# Patient Record
Sex: FEMALE | Race: BLACK OR AFRICAN AMERICAN | NOT HISPANIC OR LATINO
[De-identification: names, ages, dates, MRNs, and addresses within clinical notes are randomized per-mention and may not be internally consistent; named-entity substitution may affect disease eponyms.]

---

## 2017-03-23 PROBLEM — Z00.00 ENCOUNTER FOR PREVENTIVE HEALTH EXAMINATION: Noted: 2017-03-23

## 2017-04-14 ENCOUNTER — APPOINTMENT (OUTPATIENT)
Dept: OTOLARYNGOLOGY | Facility: CLINIC | Age: 52
End: 2017-04-14

## 2017-04-14 VITALS
DIASTOLIC BLOOD PRESSURE: 62 MMHG | WEIGHT: 250 LBS | HEIGHT: 64 IN | BODY MASS INDEX: 42.68 KG/M2 | HEART RATE: 76 BPM | SYSTOLIC BLOOD PRESSURE: 116 MMHG

## 2017-04-14 DIAGNOSIS — Z87.891 PERSONAL HISTORY OF NICOTINE DEPENDENCE: ICD-10-CM

## 2017-04-14 DIAGNOSIS — Z87.19 PERSONAL HISTORY OF OTHER DISEASES OF THE DIGESTIVE SYSTEM: ICD-10-CM

## 2017-04-14 DIAGNOSIS — Z82.49 FAMILY HISTORY OF ISCHEMIC HEART DISEASE AND OTHER DISEASES OF THE CIRCULATORY SYSTEM: ICD-10-CM

## 2017-04-14 DIAGNOSIS — I25.2 OLD MYOCARDIAL INFARCTION: ICD-10-CM

## 2017-04-14 DIAGNOSIS — I50.9 HEART FAILURE, UNSPECIFIED: ICD-10-CM

## 2017-04-14 DIAGNOSIS — K42.9 UMBILICAL HERNIA W/OUT OBSTRUCTION OR GANGRENE: ICD-10-CM

## 2017-04-14 DIAGNOSIS — E11.9 TYPE 2 DIABETES MELLITUS W/OUT COMPLICATIONS: ICD-10-CM

## 2017-04-14 DIAGNOSIS — Z83.3 FAMILY HISTORY OF DIABETES MELLITUS: ICD-10-CM

## 2017-04-14 DIAGNOSIS — Z78.9 OTHER SPECIFIED HEALTH STATUS: ICD-10-CM

## 2017-04-14 DIAGNOSIS — Z80.1 FAMILY HISTORY OF MALIGNANT NEOPLASM OF TRACHEA, BRONCHUS AND LUNG: ICD-10-CM

## 2017-04-14 DIAGNOSIS — Z86.79 PERSONAL HISTORY OF OTHER DISEASES OF THE CIRCULATORY SYSTEM: ICD-10-CM

## 2017-04-14 DIAGNOSIS — E78.00 PURE HYPERCHOLESTEROLEMIA, UNSPECIFIED: ICD-10-CM

## 2017-04-14 DIAGNOSIS — Z87.09 PERSONAL HISTORY OF OTHER DISEASES OF THE RESPIRATORY SYSTEM: ICD-10-CM

## 2017-04-24 ENCOUNTER — RESULT REVIEW (OUTPATIENT)
Age: 52
End: 2017-04-24

## 2017-05-22 ENCOUNTER — APPOINTMENT (OUTPATIENT)
Dept: GASTROENTEROLOGY | Facility: CLINIC | Age: 52
End: 2017-05-22

## 2017-05-22 VITALS
HEIGHT: 64 IN | RESPIRATION RATE: 16 BRPM | HEART RATE: 85 BPM | OXYGEN SATURATION: 98 % | DIASTOLIC BLOOD PRESSURE: 85 MMHG | BODY MASS INDEX: 42.68 KG/M2 | SYSTOLIC BLOOD PRESSURE: 161 MMHG | WEIGHT: 250 LBS

## 2017-06-29 ENCOUNTER — FORM ENCOUNTER (OUTPATIENT)
Age: 52
End: 2017-06-29

## 2017-06-30 ENCOUNTER — OUTPATIENT (OUTPATIENT)
Dept: OUTPATIENT SERVICES | Facility: HOSPITAL | Age: 52
LOS: 1 days | End: 2017-06-30
Payer: MEDICAID

## 2017-06-30 VITALS
HEIGHT: 64 IN | TEMPERATURE: 99 F | WEIGHT: 263.01 LBS | OXYGEN SATURATION: 98 % | DIASTOLIC BLOOD PRESSURE: 90 MMHG | RESPIRATION RATE: 16 BRPM | HEART RATE: 88 BPM | SYSTOLIC BLOOD PRESSURE: 140 MMHG

## 2017-06-30 DIAGNOSIS — E11.9 TYPE 2 DIABETES MELLITUS WITHOUT COMPLICATIONS: ICD-10-CM

## 2017-06-30 DIAGNOSIS — I25.2 OLD MYOCARDIAL INFARCTION: ICD-10-CM

## 2017-06-30 DIAGNOSIS — E07.9 DISORDER OF THYROID, UNSPECIFIED: ICD-10-CM

## 2017-06-30 DIAGNOSIS — Z98.891 HISTORY OF UTERINE SCAR FROM PREVIOUS SURGERY: Chronic | ICD-10-CM

## 2017-06-30 DIAGNOSIS — I10 ESSENTIAL (PRIMARY) HYPERTENSION: ICD-10-CM

## 2017-06-30 DIAGNOSIS — J45.909 UNSPECIFIED ASTHMA, UNCOMPLICATED: ICD-10-CM

## 2017-06-30 DIAGNOSIS — Z90.710 ACQUIRED ABSENCE OF BOTH CERVIX AND UTERUS: Chronic | ICD-10-CM

## 2017-06-30 LAB
BUN SERPL-MCNC: 16 MG/DL — SIGNIFICANT CHANGE UP (ref 7–23)
CALCIUM SERPL-MCNC: 9.8 MG/DL — SIGNIFICANT CHANGE UP (ref 8.4–10.5)
CHLORIDE SERPL-SCNC: 98 MMOL/L — SIGNIFICANT CHANGE UP (ref 98–107)
CO2 SERPL-SCNC: 24 MMOL/L — SIGNIFICANT CHANGE UP (ref 22–31)
CREAT SERPL-MCNC: 1.06 MG/DL — SIGNIFICANT CHANGE UP (ref 0.5–1.3)
GLUCOSE SERPL-MCNC: 213 MG/DL — HIGH (ref 70–99)
HBA1C BLD-MCNC: 7.1 % — HIGH (ref 4–5.6)
HCT VFR BLD CALC: 34.8 % — SIGNIFICANT CHANGE UP (ref 34.5–45)
HGB BLD-MCNC: 11.7 G/DL — SIGNIFICANT CHANGE UP (ref 11.5–15.5)
MCHC RBC-ENTMCNC: 28.8 PG — SIGNIFICANT CHANGE UP (ref 27–34)
MCHC RBC-ENTMCNC: 33.6 % — SIGNIFICANT CHANGE UP (ref 32–36)
MCV RBC AUTO: 85.7 FL — SIGNIFICANT CHANGE UP (ref 80–100)
NRBC # FLD: 0 — SIGNIFICANT CHANGE UP
PLATELET # BLD AUTO: 214 K/UL — SIGNIFICANT CHANGE UP (ref 150–400)
PMV BLD: 12 FL — SIGNIFICANT CHANGE UP (ref 7–13)
POTASSIUM SERPL-MCNC: 3.3 MMOL/L — LOW (ref 3.5–5.3)
POTASSIUM SERPL-SCNC: 3.3 MMOL/L — LOW (ref 3.5–5.3)
RBC # BLD: 4.06 M/UL — SIGNIFICANT CHANGE UP (ref 3.8–5.2)
RBC # FLD: 14.3 % — SIGNIFICANT CHANGE UP (ref 10.3–14.5)
SODIUM SERPL-SCNC: 139 MMOL/L — SIGNIFICANT CHANGE UP (ref 135–145)
WBC # BLD: 10.62 K/UL — HIGH (ref 3.8–10.5)
WBC # FLD AUTO: 10.62 K/UL — HIGH (ref 3.8–10.5)

## 2017-06-30 PROCEDURE — 93010 ELECTROCARDIOGRAM REPORT: CPT

## 2017-06-30 PROCEDURE — 71020: CPT | Mod: 26

## 2017-06-30 RX ORDER — SODIUM CHLORIDE 9 MG/ML
1000 INJECTION, SOLUTION INTRAVENOUS
Qty: 0 | Refills: 0 | Status: DISCONTINUED | OUTPATIENT
Start: 2017-07-17 | End: 2017-07-18

## 2017-06-30 NOTE — H&P PST ADULT - PMH
Asthma    GERD (gastroesophageal reflux disease)    HLD (hyperlipidemia)    Hypertension    MI, old  at age 41  Obesity    Smoking history Asthma    CHF (congestive heart failure)    GERD (gastroesophageal reflux disease)    HLD (hyperlipidemia)    Hypertension    MI, old  at age 41  Obesity    Smoking history Asthma    CHF (congestive heart failure)    Diabetes mellitus    GERD (gastroesophageal reflux disease)    HLD (hyperlipidemia)    Hypertension    MI, old  at age 41  Obesity    Smoking history

## 2017-06-30 NOTE — H&P PST ADULT - HISTORY OF PRESENT ILLNESS
52 yr old female PMH HTN, DM type 2, MI 11 yrs ago, presents to PST with pre op dx: Disorder of thyroid, unspecified for pre op evaluation and scheduled for 52 yr old female PMH HTN, DM type 2, MI 11 yrs ago, presents to PST with pre op dx: Disorder of thyroid, unspecified for pre op evaluation and scheduled for Left thyroidectomy on 7/17/2017 52 yr old female PMH HTN, DM type 2, MI -11 yrs ago, presents to PST with pre op dx: Disorder of thyroid, unspecified for pre op evaluation and scheduled for Left thyroidectomy on 7/17/2017

## 2017-06-30 NOTE — H&P PST ADULT - PROBLEM SELECTOR PLAN 1
52 yr old female scheduled for Left thyroidectomy on 7/17/2017  Procedure and site confirmed with patient and surgical coordinator as left thyroidectomy  Pre op instruction given and patient verbalized understanding  4 Positive on STOP BANG questioner, Jerome precaution recommended, OR booking notified

## 2017-06-30 NOTE — H&P PST ADULT - PROBLEM SELECTOR PLAN 5
Patient instructed not to take Metformin the night before and in am of surgery  Will check FSBS in am of surgery

## 2017-06-30 NOTE — H&P PST ADULT - PROBLEM SELECTOR PLAN 3
Denies chest pain or discomfort, Last dose of  mg 7 days prior to surgery as per Surgeon  Will obtain stress, echo result and cardiology clearance

## 2017-07-06 ENCOUNTER — APPOINTMENT (OUTPATIENT)
Dept: GASTROENTEROLOGY | Facility: GI CENTER | Age: 52
End: 2017-07-06

## 2017-07-06 ENCOUNTER — OUTPATIENT (OUTPATIENT)
Dept: OUTPATIENT SERVICES | Facility: HOSPITAL | Age: 52
LOS: 1 days | End: 2017-07-06
Payer: COMMERCIAL

## 2017-07-06 ENCOUNTER — RESULT REVIEW (OUTPATIENT)
Age: 52
End: 2017-07-06

## 2017-07-06 DIAGNOSIS — R12 HEARTBURN: ICD-10-CM

## 2017-07-06 DIAGNOSIS — Z98.891 HISTORY OF UTERINE SCAR FROM PREVIOUS SURGERY: Chronic | ICD-10-CM

## 2017-07-06 DIAGNOSIS — Z90.710 ACQUIRED ABSENCE OF BOTH CERVIX AND UTERUS: Chronic | ICD-10-CM

## 2017-07-06 PROCEDURE — 88305 TISSUE EXAM BY PATHOLOGIST: CPT | Mod: 26

## 2017-07-06 PROCEDURE — 88305 TISSUE EXAM BY PATHOLOGIST: CPT

## 2017-07-06 PROCEDURE — 88342 IMHCHEM/IMCYTCHM 1ST ANTB: CPT

## 2017-07-06 PROCEDURE — 88341 IMHCHEM/IMCYTCHM EA ADD ANTB: CPT | Mod: 26

## 2017-07-06 PROCEDURE — 88342 IMHCHEM/IMCYTCHM 1ST ANTB: CPT | Mod: 26

## 2017-07-06 PROCEDURE — 43239 EGD BIOPSY SINGLE/MULTIPLE: CPT

## 2017-07-07 LAB — SURGICAL PATHOLOGY FINAL REPORT - CH: SIGNIFICANT CHANGE UP

## 2017-07-14 NOTE — ASU PATIENT PROFILE, ADULT - PMH
Asthma    CHF (congestive heart failure)    Diabetes mellitus    GERD (gastroesophageal reflux disease)    HLD (hyperlipidemia)    Hypertension    MI, old  at age 41  Obesity    Smoking history Asthma    CHF (congestive heart failure)    Diabetes mellitus    GERD (gastroesophageal reflux disease)    HLD (hyperlipidemia)    Hypertension    MI, old  at age 41  Obesity    Smoking history    Tendon tear  on each side of right knee

## 2017-07-17 ENCOUNTER — RESULT REVIEW (OUTPATIENT)
Age: 52
End: 2017-07-17

## 2017-07-17 ENCOUNTER — APPOINTMENT (OUTPATIENT)
Dept: OTOLARYNGOLOGY | Facility: HOSPITAL | Age: 52
End: 2017-07-17

## 2017-07-17 ENCOUNTER — INPATIENT (INPATIENT)
Facility: HOSPITAL | Age: 52
LOS: 0 days | Discharge: HOME | End: 2017-07-18
Attending: OTOLARYNGOLOGY | Admitting: OTOLARYNGOLOGY
Payer: MEDICAID

## 2017-07-17 ENCOUNTER — TRANSCRIPTION ENCOUNTER (OUTPATIENT)
Age: 52
End: 2017-07-17

## 2017-07-17 VITALS
WEIGHT: 263.01 LBS | OXYGEN SATURATION: 100 % | RESPIRATION RATE: 16 BRPM | SYSTOLIC BLOOD PRESSURE: 145 MMHG | HEART RATE: 67 BPM | HEIGHT: 64 IN | TEMPERATURE: 99 F | DIASTOLIC BLOOD PRESSURE: 84 MMHG

## 2017-07-17 DIAGNOSIS — E07.9 DISORDER OF THYROID, UNSPECIFIED: ICD-10-CM

## 2017-07-17 DIAGNOSIS — Z98.891 HISTORY OF UTERINE SCAR FROM PREVIOUS SURGERY: Chronic | ICD-10-CM

## 2017-07-17 DIAGNOSIS — Z90.710 ACQUIRED ABSENCE OF BOTH CERVIX AND UTERUS: Chronic | ICD-10-CM

## 2017-07-17 LAB
GLUCOSE BLDV-MCNC: 146 — HIGH (ref 70–99)
HCG UR QL: NEGATIVE — SIGNIFICANT CHANGE UP
POTASSIUM BLDV-SCNC: 4.4 MMOL/L — SIGNIFICANT CHANGE UP (ref 3.4–4.5)

## 2017-07-17 PROCEDURE — 71010: CPT | Mod: 26

## 2017-07-17 PROCEDURE — 88307 TISSUE EXAM BY PATHOLOGIST: CPT | Mod: 26

## 2017-07-17 PROCEDURE — 60220 PARTIAL REMOVAL OF THYROID: CPT | Mod: LT

## 2017-07-17 PROCEDURE — 60220 PARTIAL REMOVAL OF THYROID: CPT | Mod: AS,LT

## 2017-07-17 RX ORDER — INSULIN LISPRO 100/ML
VIAL (ML) SUBCUTANEOUS
Qty: 0 | Refills: 0 | Status: DISCONTINUED | OUTPATIENT
Start: 2017-07-17 | End: 2017-07-17

## 2017-07-17 RX ORDER — GLUCAGON INJECTION, SOLUTION 0.5 MG/.1ML
1 INJECTION, SOLUTION SUBCUTANEOUS ONCE
Qty: 0 | Refills: 0 | Status: DISCONTINUED | OUTPATIENT
Start: 2017-07-17 | End: 2017-07-18

## 2017-07-17 RX ORDER — METOPROLOL TARTRATE 50 MG
50 TABLET ORAL DAILY
Qty: 0 | Refills: 0 | Status: DISCONTINUED | OUTPATIENT
Start: 2017-07-17 | End: 2017-07-18

## 2017-07-17 RX ORDER — LISINOPRIL 2.5 MG/1
40 TABLET ORAL DAILY
Qty: 0 | Refills: 0 | Status: DISCONTINUED | OUTPATIENT
Start: 2017-07-17 | End: 2017-07-18

## 2017-07-17 RX ORDER — OXYCODONE AND ACETAMINOPHEN 5; 325 MG/1; MG/1
2 TABLET ORAL EVERY 4 HOURS
Qty: 0 | Refills: 0 | Status: DISCONTINUED | OUTPATIENT
Start: 2017-07-17 | End: 2017-07-17

## 2017-07-17 RX ORDER — DEXTROSE 50 % IN WATER 50 %
12.5 SYRINGE (ML) INTRAVENOUS ONCE
Qty: 0 | Refills: 0 | Status: DISCONTINUED | OUTPATIENT
Start: 2017-07-17 | End: 2017-07-18

## 2017-07-17 RX ORDER — SODIUM CHLORIDE 9 MG/ML
3 INJECTION INTRAMUSCULAR; INTRAVENOUS; SUBCUTANEOUS EVERY 8 HOURS
Qty: 0 | Refills: 0 | Status: DISCONTINUED | OUTPATIENT
Start: 2017-07-17 | End: 2017-07-18

## 2017-07-17 RX ORDER — NIFEDIPINE 30 MG
60 TABLET, EXTENDED RELEASE 24 HR ORAL DAILY
Qty: 0 | Refills: 0 | Status: DISCONTINUED | OUTPATIENT
Start: 2017-07-17 | End: 2017-07-18

## 2017-07-17 RX ORDER — FENTANYL CITRATE 50 UG/ML
25 INJECTION INTRAVENOUS
Qty: 0 | Refills: 0 | Status: DISCONTINUED | OUTPATIENT
Start: 2017-07-17 | End: 2017-07-17

## 2017-07-17 RX ORDER — OXYCODONE AND ACETAMINOPHEN 5; 325 MG/1; MG/1
1 TABLET ORAL EVERY 4 HOURS
Qty: 0 | Refills: 0 | Status: DISCONTINUED | OUTPATIENT
Start: 2017-07-17 | End: 2017-07-17

## 2017-07-17 RX ORDER — PROPOFOL 10 MG/ML
100 INJECTION, EMULSION INTRAVENOUS
Qty: 500 | Refills: 0 | Status: DISCONTINUED | OUTPATIENT
Start: 2017-07-17 | End: 2017-07-17

## 2017-07-17 RX ORDER — OXYCODONE AND ACETAMINOPHEN 5; 325 MG/1; MG/1
1 TABLET ORAL EVERY 6 HOURS
Qty: 0 | Refills: 0 | Status: DISCONTINUED | OUTPATIENT
Start: 2017-07-17 | End: 2017-07-18

## 2017-07-17 RX ORDER — DEXTROSE 50 % IN WATER 50 %
25 SYRINGE (ML) INTRAVENOUS ONCE
Qty: 0 | Refills: 0 | Status: DISCONTINUED | OUTPATIENT
Start: 2017-07-17 | End: 2017-07-18

## 2017-07-17 RX ORDER — PANTOPRAZOLE SODIUM 20 MG/1
40 TABLET, DELAYED RELEASE ORAL
Qty: 0 | Refills: 0 | Status: DISCONTINUED | OUTPATIENT
Start: 2017-07-17 | End: 2017-07-18

## 2017-07-17 RX ORDER — OXYCODONE AND ACETAMINOPHEN 5; 325 MG/1; MG/1
2 TABLET ORAL EVERY 6 HOURS
Qty: 0 | Refills: 0 | Status: DISCONTINUED | OUTPATIENT
Start: 2017-07-17 | End: 2017-07-18

## 2017-07-17 RX ORDER — DEXTROSE 50 % IN WATER 50 %
1 SYRINGE (ML) INTRAVENOUS ONCE
Qty: 0 | Refills: 0 | Status: DISCONTINUED | OUTPATIENT
Start: 2017-07-17 | End: 2017-07-18

## 2017-07-17 RX ORDER — HYDROCHLOROTHIAZIDE 25 MG
25 TABLET ORAL DAILY
Qty: 0 | Refills: 0 | Status: DISCONTINUED | OUTPATIENT
Start: 2017-07-17 | End: 2017-07-18

## 2017-07-17 RX ORDER — ALBUTEROL 90 UG/1
2 AEROSOL, METERED ORAL EVERY 6 HOURS
Qty: 0 | Refills: 0 | Status: DISCONTINUED | OUTPATIENT
Start: 2017-07-17 | End: 2017-07-18

## 2017-07-17 RX ORDER — INSULIN LISPRO 100/ML
VIAL (ML) SUBCUTANEOUS
Qty: 0 | Refills: 0 | Status: DISCONTINUED | OUTPATIENT
Start: 2017-07-17 | End: 2017-07-18

## 2017-07-17 RX ORDER — ONDANSETRON 8 MG/1
4 TABLET, FILM COATED ORAL ONCE
Qty: 0 | Refills: 0 | Status: DISCONTINUED | OUTPATIENT
Start: 2017-07-17 | End: 2017-07-17

## 2017-07-17 RX ORDER — SODIUM CHLORIDE 9 MG/ML
1000 INJECTION, SOLUTION INTRAVENOUS
Qty: 0 | Refills: 0 | Status: DISCONTINUED | OUTPATIENT
Start: 2017-07-17 | End: 2017-07-18

## 2017-07-17 RX ORDER — ATORVASTATIN CALCIUM 80 MG/1
10 TABLET, FILM COATED ORAL AT BEDTIME
Qty: 0 | Refills: 0 | Status: DISCONTINUED | OUTPATIENT
Start: 2017-07-17 | End: 2017-07-18

## 2017-07-17 RX ADMIN — OXYCODONE AND ACETAMINOPHEN 1 TABLET(S): 5; 325 TABLET ORAL at 20:05

## 2017-07-17 RX ADMIN — OXYCODONE AND ACETAMINOPHEN 1 TABLET(S): 5; 325 TABLET ORAL at 19:35

## 2017-07-17 RX ADMIN — Medication 50 MILLIGRAM(S): at 19:35

## 2017-07-17 RX ADMIN — SODIUM CHLORIDE 3 MILLILITER(S): 9 INJECTION INTRAMUSCULAR; INTRAVENOUS; SUBCUTANEOUS at 21:46

## 2017-07-17 RX ADMIN — PROPOFOL 71.58 MICROGRAM(S)/KG/MIN: 10 INJECTION, EMULSION INTRAVENOUS at 11:00

## 2017-07-17 RX ADMIN — OXYCODONE AND ACETAMINOPHEN 2 TABLET(S): 5; 325 TABLET ORAL at 22:38

## 2017-07-17 RX ADMIN — SODIUM CHLORIDE 3 MILLILITER(S): 9 INJECTION INTRAMUSCULAR; INTRAVENOUS; SUBCUTANEOUS at 12:45

## 2017-07-17 RX ADMIN — OXYCODONE AND ACETAMINOPHEN 2 TABLET(S): 5; 325 TABLET ORAL at 23:08

## 2017-07-17 RX ADMIN — FENTANYL CITRATE 25 MICROGRAM(S): 50 INJECTION INTRAVENOUS at 17:15

## 2017-07-17 RX ADMIN — SODIUM CHLORIDE 30 MILLILITER(S): 9 INJECTION, SOLUTION INTRAVENOUS at 11:00

## 2017-07-17 RX ADMIN — Medication 2: at 19:56

## 2017-07-17 NOTE — PROVIDER CONTACT NOTE (OTHER) - REASON
Patient hypertensive. Patient does not have sliding scale for insulin. Patient hypertensive. Patient does not have sliding scale for insulin before bedtime

## 2017-07-17 NOTE — ASU DISCHARGE PLAN (ADULT/PEDIATRIC). - MEDICATION SUMMARY - MEDICATIONS TO TAKE
I will START or STAY ON the medications listed below when I get home from the hospital:    oxycodone-acetaminophen 5mg-325mg oral tablet  -- 1 tab(s) by mouth every 6 hours MDD:8 tabs  -- Caution federal law prohibits the transfer of this drug to any person other  than the person for whom it was prescribed.  May cause drowsiness.  Alcohol may intensify this effect.  Use care when operating dangerous machinery.  This prescription cannot be refilled.  This product contains acetaminophen.  Do not use  with any other product containing acetaminophen to prevent possible liver damage.  Using more of this medication than prescribed may cause serious breathing problems.    -- Indication: For Moderate pain    aspirin 325 mg oral tablet  -- 1 tab by mouth once a day (LAST DOSE 7/9)  -- Indication: For analgesia    lisinopril 40 mg oral tablet  -- 1 tab(s) by mouth once a day am  -- Indication: For hypertension    metFORMIN 1000 mg oral tablet  -- 1 tab(s) by mouth 2 times a day  -- Indication: For Diabetes    atorvastatin 10 mg oral tablet  -- 1 tab(s) by mouth once a day (at bedtime)  -- Indication: For hypercholesterolemia    metoprolol tartrate 50 mg oral tablet  -- 1 tab(s) by mouth 2 times a day  -- Indication: For hyptertension    albuterol 90 mcg/inh inhalation aerosol  -- 2 puff(s) inhaled   a day, As Needed  -- Indication: For asthma    NIFEdipine 60 mg oral tablet, extended release  -- 1 tab(s) by mouth once a day,am  -- Indication: For hypertension    hydroCHLOROthiazide 25 mg oral tablet  -- 1 tab(s) by mouth once a day am  -- Indication: For hypertension    omeprazole 40 mg oral delayed release capsule  -- 1 cap(s) by mouth once a day am  -- Indication: For GERD    Flovent  mcg/inh inhalation aerosol  -- 2 puff(s) inhaled 2 times a day, As Needed  -- Indication: For Asthma

## 2017-07-17 NOTE — PROVIDER CONTACT NOTE (OTHER) - ASSESSMENT
/90  HR 93  Patient asymptomatic. Denies SOB, chest pain, dizziness or lightheadedness.  Patient does not have sliding scale for insulin. Only before meals. /90  HR 93  Patient asymptomatic. Denies SOB, chest pain, dizziness or lightheadedness.  Patient does not have sliding scale for insulin before bedtime. Only before meals. /90, HR 93, Patient asymptomatic. Denies SOB, chest pain, dizziness or lightheadedness.    . Patient does not have sliding scale for insulin before bedtime. Only before meals.

## 2017-07-18 ENCOUNTER — TRANSCRIPTION ENCOUNTER (OUTPATIENT)
Age: 52
End: 2017-07-18

## 2017-07-18 VITALS
OXYGEN SATURATION: 100 % | DIASTOLIC BLOOD PRESSURE: 76 MMHG | HEART RATE: 78 BPM | RESPIRATION RATE: 18 BRPM | SYSTOLIC BLOOD PRESSURE: 138 MMHG | TEMPERATURE: 98 F

## 2017-07-18 RX ORDER — OXYCODONE HYDROCHLORIDE 5 MG/1
2 TABLET ORAL
Qty: 40 | Refills: 0 | OUTPATIENT
Start: 2017-07-18 | End: 2017-07-23

## 2017-07-18 RX ADMIN — PANTOPRAZOLE SODIUM 40 MILLIGRAM(S): 20 TABLET, DELAYED RELEASE ORAL at 07:35

## 2017-07-18 RX ADMIN — OXYCODONE AND ACETAMINOPHEN 2 TABLET(S): 5; 325 TABLET ORAL at 08:28

## 2017-07-18 RX ADMIN — Medication 2: at 09:07

## 2017-07-18 RX ADMIN — Medication: at 00:03

## 2017-07-18 RX ADMIN — SODIUM CHLORIDE 3 MILLILITER(S): 9 INJECTION INTRAMUSCULAR; INTRAVENOUS; SUBCUTANEOUS at 05:04

## 2017-07-18 RX ADMIN — Medication 25 MILLIGRAM(S): at 07:34

## 2017-07-18 RX ADMIN — OXYCODONE AND ACETAMINOPHEN 2 TABLET(S): 5; 325 TABLET ORAL at 07:58

## 2017-07-18 RX ADMIN — LISINOPRIL 40 MILLIGRAM(S): 2.5 TABLET ORAL at 07:34

## 2017-07-18 RX ADMIN — Medication 60 MILLIGRAM(S): at 07:35

## 2017-07-18 RX ADMIN — Medication 50 MILLIGRAM(S): at 07:35

## 2017-07-18 NOTE — DISCHARGE NOTE ADULT - INSTRUCTIONS
Watch for signs of infection; redness, swelling, fever, chills or heat, report such symptoms to the MD. No driving while taking pain medication, it causes drowsiness & constipation. Drink 6-8 glasses of fluids daily to promote hydration. No heavy lifting, pulling or pushing heavy objects. Follow up with primary & surgical MD. Follow up with endocrinologists regarding blood sugars. Remove neck dressing tomorrow but keep small steri strips in place. Shower as directed by MD, do not scrub site, allow water to run over incision & pat dry. Do not apply any lotions, ointments or powders to incision. Alert & oriented. Out of bed ambulating. Tolerating diet without nausea or vomiting. No neck swelling or respiratory difficulty. Neck steris are dry & intact. SHANTHI drain removed, dressing is dry & intact over site. Will follow up with endocrinologists regarding blood sugars. Voiding without difficulty. Vitals stable, afebrile. Understands all discharge instruction & will follow up with the MD. Time allowed for questions.

## 2017-07-18 NOTE — DISCHARGE NOTE ADULT - CARE PLAN
Principal Discharge DX:	Thyroid mass  Goal:	hemithyroidectomy  Instructions for follow-up, activity and diet:	regular

## 2017-07-18 NOTE — DISCHARGE NOTE ADULT - CARE PROVIDER_API CALL
Willie Garcia), [facility]  [address] [place] [id number]  Phone: [phone]  Fax: [phone]

## 2017-07-18 NOTE — PROGRESS NOTE ADULT - SUBJECTIVE AND OBJECTIVE BOX
Pt seen and examined, no acute events overnight, no resp issues    avss  nad, lying in bed  nonlabored breathing on ra  nc clear  oc/op clear  neck soft, flat, ECTOR w/ minimal old blood drainage, incision c/d/i    A/P: 52F s/p L thyroidectomy  -pain control prn  -monitor resp status  -ector output  -home meds  -oob ad king/dvt ppx  -will d/w attending dispo

## 2017-07-18 NOTE — DISCHARGE NOTE ADULT - PATIENT PORTAL LINK FT
“You can access the [facility] Patient Portal, offered by [facility], by registering with the following website: [email or web address]”

## 2017-07-18 NOTE — DISCHARGE NOTE ADULT - MEDICATION SUMMARY - MEDICATIONS TO TAKE
I will START or STAY ON the medications listed below when I get home from the hospital:    acetaminophen-oxycodone 325 mg-5 mg oral tablet  -- 2 tab(s) by mouth every 6 hours, As needed, Moderate Pain (4 - 6) MDD:8  -- Indication: For pain    aspirin 325 mg oral tablet  -- 1 tab by mouth once a day (LAST DOSE 7/9)  -- Indication: For outpt med    lisinopril 40 mg oral tablet  -- 1 tab(s) by mouth once a day am  -- Indication: For HTN    metFORMIN 1000 mg oral tablet  -- 1 tab(s) by mouth 2 times a day  -- Indication: For DM    atorvastatin 10 mg oral tablet  -- 1 tab(s) by mouth once a day (at bedtime)  -- Indication: For cholesterol    metoprolol tartrate 50 mg oral tablet  -- 1 tab(s) by mouth 2 times a day  -- Indication: For HTN    albuterol 90 mcg/inh inhalation aerosol  -- 2 puff(s) inhaled   a day, As Needed  -- Indication: For asthma    NIFEdipine 60 mg oral tablet, extended release  -- 1 tab(s) by mouth once a day,am  -- Indication: For HTN    hydroCHLOROthiazide 25 mg oral tablet  -- 1 tab(s) by mouth once a day am  -- Indication: For HTN    omeprazole 40 mg oral delayed release capsule  -- 1 cap(s) by mouth once a day am  -- Indication: For GERD    Flovent  mcg/inh inhalation aerosol  -- 2 puff(s) inhaled 2 times a day, As Needed  -- Indication: For asthma

## 2017-07-18 NOTE — DISCHARGE NOTE ADULT - CONDITIONS AT DISCHARGE
Alert & oriented. Out of bed ambulating. Tolerating diet without nausea or vomiting. No neck swelling or respiratory difficulty. Neck steris are dry & intact. SHANTHI drain removed, dressing is dry & intact over site. Will follow up with endocrinologists regarding blood sugars. Voiding without difficulty. Vitals stable, afebrile. Understands all discharge instruction & will follow up with the MD. Time allowed for questions.

## 2017-07-19 PROBLEM — T14.8 OTHER INJURY OF UNSPECIFIED BODY REGION: Chronic | Status: ACTIVE | Noted: 2017-07-17

## 2017-07-21 LAB — SURGICAL PATHOLOGY STUDY: SIGNIFICANT CHANGE UP

## 2017-07-26 ENCOUNTER — APPOINTMENT (OUTPATIENT)
Dept: OTOLARYNGOLOGY | Facility: CLINIC | Age: 52
End: 2017-07-26

## 2017-08-01 ENCOUNTER — APPOINTMENT (OUTPATIENT)
Dept: OTOLARYNGOLOGY | Facility: CLINIC | Age: 52
End: 2017-08-01
Payer: MEDICAID

## 2017-08-01 VITALS
RESPIRATION RATE: 18 BRPM | BODY MASS INDEX: 42.68 KG/M2 | HEART RATE: 76 BPM | DIASTOLIC BLOOD PRESSURE: 88 MMHG | WEIGHT: 250 LBS | SYSTOLIC BLOOD PRESSURE: 125 MMHG | HEIGHT: 64 IN

## 2017-08-01 DIAGNOSIS — Z09 ENCOUNTER FOR FOLLOW-UP EXAMINATION AFTER COMPLETED TREATMENT FOR CONDITIONS OTHER THAN MALIGNANT NEOPLASM: ICD-10-CM

## 2017-08-01 DIAGNOSIS — E07.9 DISORDER OF THYROID, UNSPECIFIED: ICD-10-CM

## 2017-08-01 PROCEDURE — 99024 POSTOP FOLLOW-UP VISIT: CPT

## 2018-01-31 ENCOUNTER — OUTPATIENT (OUTPATIENT)
Dept: OUTPATIENT SERVICES | Facility: HOSPITAL | Age: 53
LOS: 1 days | Discharge: HOME | End: 2018-01-31
Payer: OTHER MISCELLANEOUS

## 2018-01-31 VITALS
RESPIRATION RATE: 18 BRPM | OXYGEN SATURATION: 97 % | SYSTOLIC BLOOD PRESSURE: 152 MMHG | DIASTOLIC BLOOD PRESSURE: 94 MMHG | WEIGHT: 251.33 LBS | TEMPERATURE: 98 F | HEIGHT: 64 IN | HEART RATE: 80 BPM

## 2018-01-31 DIAGNOSIS — Z01.818 ENCOUNTER FOR OTHER PREPROCEDURAL EXAMINATION: ICD-10-CM

## 2018-01-31 DIAGNOSIS — E78.5 HYPERLIPIDEMIA, UNSPECIFIED: ICD-10-CM

## 2018-01-31 DIAGNOSIS — M25.569 PAIN IN UNSPECIFIED KNEE: ICD-10-CM

## 2018-01-31 DIAGNOSIS — I10 ESSENTIAL (PRIMARY) HYPERTENSION: ICD-10-CM

## 2018-01-31 DIAGNOSIS — Z98.891 HISTORY OF UTERINE SCAR FROM PREVIOUS SURGERY: Chronic | ICD-10-CM

## 2018-01-31 DIAGNOSIS — Z90.710 ACQUIRED ABSENCE OF BOTH CERVIX AND UTERUS: Chronic | ICD-10-CM

## 2018-01-31 DIAGNOSIS — E89.0 POSTPROCEDURAL HYPOTHYROIDISM: Chronic | ICD-10-CM

## 2018-01-31 DIAGNOSIS — E11.9 TYPE 2 DIABETES MELLITUS WITHOUT COMPLICATIONS: ICD-10-CM

## 2018-01-31 DIAGNOSIS — S83.241D OTHER TEAR OF MEDIAL MENISCUS, CURRENT INJURY, RIGHT KNEE, SUBSEQUENT ENCOUNTER: ICD-10-CM

## 2018-01-31 DIAGNOSIS — J45.909 UNSPECIFIED ASTHMA, UNCOMPLICATED: ICD-10-CM

## 2018-01-31 LAB
ANION GAP SERPL CALC-SCNC: 10 MMOL/L — SIGNIFICANT CHANGE UP (ref 5–17)
BASOPHILS # BLD AUTO: 0.06 K/UL — SIGNIFICANT CHANGE UP (ref 0–0.2)
BASOPHILS NFR BLD AUTO: 0.9 % — SIGNIFICANT CHANGE UP (ref 0–2)
BUN SERPL-MCNC: 12 MG/DL — SIGNIFICANT CHANGE UP (ref 7–23)
CALCIUM SERPL-MCNC: 9.5 MG/DL — SIGNIFICANT CHANGE UP (ref 8.5–10.1)
CHLORIDE SERPL-SCNC: 105 MMOL/L — SIGNIFICANT CHANGE UP (ref 96–108)
CO2 SERPL-SCNC: 25 MMOL/L — SIGNIFICANT CHANGE UP (ref 22–31)
CREAT SERPL-MCNC: 1.02 MG/DL — SIGNIFICANT CHANGE UP (ref 0.5–1.3)
EOSINOPHIL # BLD AUTO: 0.23 K/UL — SIGNIFICANT CHANGE UP (ref 0–0.5)
EOSINOPHIL NFR BLD AUTO: 3.3 % — SIGNIFICANT CHANGE UP (ref 0–6)
GLUCOSE SERPL-MCNC: 134 MG/DL — HIGH (ref 70–99)
HCT VFR BLD CALC: 39 % — SIGNIFICANT CHANGE UP (ref 34.5–45)
HGB BLD-MCNC: 13.5 G/DL — SIGNIFICANT CHANGE UP (ref 11.5–15.5)
IMM GRANULOCYTES NFR BLD AUTO: 0.3 % — SIGNIFICANT CHANGE UP (ref 0–1.5)
LYMPHOCYTES # BLD AUTO: 1.67 K/UL — SIGNIFICANT CHANGE UP (ref 1–3.3)
LYMPHOCYTES # BLD AUTO: 24 % — SIGNIFICANT CHANGE UP (ref 13–44)
MCHC RBC-ENTMCNC: 28.6 PG — SIGNIFICANT CHANGE UP (ref 27–34)
MCHC RBC-ENTMCNC: 34.6 GM/DL — SIGNIFICANT CHANGE UP (ref 32–36)
MCV RBC AUTO: 82.6 FL — SIGNIFICANT CHANGE UP (ref 80–100)
MONOCYTES # BLD AUTO: 0.68 K/UL — SIGNIFICANT CHANGE UP (ref 0–0.9)
MONOCYTES NFR BLD AUTO: 9.8 % — SIGNIFICANT CHANGE UP (ref 2–14)
NEUTROPHILS # BLD AUTO: 4.31 K/UL — SIGNIFICANT CHANGE UP (ref 1.8–7.4)
NEUTROPHILS NFR BLD AUTO: 61.7 % — SIGNIFICANT CHANGE UP (ref 43–77)
PLATELET # BLD AUTO: 209 K/UL — SIGNIFICANT CHANGE UP (ref 150–400)
POTASSIUM SERPL-MCNC: 3.5 MMOL/L — SIGNIFICANT CHANGE UP (ref 3.5–5.3)
POTASSIUM SERPL-SCNC: 3.5 MMOL/L — SIGNIFICANT CHANGE UP (ref 3.5–5.3)
RBC # BLD: 4.72 M/UL — SIGNIFICANT CHANGE UP (ref 3.8–5.2)
RBC # FLD: 13.2 % — SIGNIFICANT CHANGE UP (ref 10.3–14.5)
SODIUM SERPL-SCNC: 140 MMOL/L — SIGNIFICANT CHANGE UP (ref 135–145)
WBC # BLD: 6.97 K/UL — SIGNIFICANT CHANGE UP (ref 3.8–10.5)
WBC # FLD AUTO: 6.97 K/UL — SIGNIFICANT CHANGE UP (ref 3.8–10.5)

## 2018-01-31 PROCEDURE — 93010 ELECTROCARDIOGRAM REPORT: CPT | Mod: NC

## 2018-01-31 NOTE — H&P PST ADULT - PMH
Asthma    CHF (congestive heart failure)    Diabetes mellitus    GERD (gastroesophageal reflux disease)    HLD (hyperlipidemia)    Hypertension    MI, old  at age 41  Obesity    Smoking history    Tendon tear  on each side of right knee

## 2018-01-31 NOTE — H&P PST ADULT - NSANTHOSAYNRD_GEN_A_CORE
No. SIMONE screening performed.  STOP BANG Legend: 0-2 = LOW Risk; 3-4 = INTERMEDIATE Risk; 5-8 = HIGH Risk/denies

## 2018-02-05 ENCOUNTER — OUTPATIENT (OUTPATIENT)
Dept: OUTPATIENT SERVICES | Facility: HOSPITAL | Age: 53
LOS: 1 days | Discharge: HOME | End: 2018-02-05
Payer: OTHER MISCELLANEOUS

## 2018-02-05 ENCOUNTER — RESULT REVIEW (OUTPATIENT)
Age: 53
End: 2018-02-05

## 2018-02-05 ENCOUNTER — TRANSCRIPTION ENCOUNTER (OUTPATIENT)
Age: 53
End: 2018-02-05

## 2018-02-05 VITALS
RESPIRATION RATE: 16 BRPM | DIASTOLIC BLOOD PRESSURE: 71 MMHG | OXYGEN SATURATION: 97 % | HEIGHT: 64 IN | TEMPERATURE: 98 F | HEART RATE: 69 BPM | SYSTOLIC BLOOD PRESSURE: 109 MMHG | WEIGHT: 251.33 LBS

## 2018-02-05 VITALS
DIASTOLIC BLOOD PRESSURE: 69 MMHG | TEMPERATURE: 98 F | HEART RATE: 58 BPM | OXYGEN SATURATION: 100 % | RESPIRATION RATE: 18 BRPM | SYSTOLIC BLOOD PRESSURE: 121 MMHG

## 2018-02-05 DIAGNOSIS — Z90.710 ACQUIRED ABSENCE OF BOTH CERVIX AND UTERUS: Chronic | ICD-10-CM

## 2018-02-05 DIAGNOSIS — E89.0 POSTPROCEDURAL HYPOTHYROIDISM: Chronic | ICD-10-CM

## 2018-02-05 DIAGNOSIS — Z98.891 HISTORY OF UTERINE SCAR FROM PREVIOUS SURGERY: Chronic | ICD-10-CM

## 2018-02-05 LAB — GLUCOSE BLDC GLUCOMTR-MCNC: 162 MG/DL — HIGH (ref 70–99)

## 2018-02-05 PROCEDURE — 88304 TISSUE EXAM BY PATHOLOGIST: CPT | Mod: 26

## 2018-02-05 RX ORDER — ACETAMINOPHEN 500 MG
1000 TABLET ORAL ONCE
Qty: 0 | Refills: 0 | Status: COMPLETED | OUTPATIENT
Start: 2018-02-05 | End: 2018-02-05

## 2018-02-05 RX ORDER — FENTANYL CITRATE 50 UG/ML
25 INJECTION INTRAVENOUS
Qty: 0 | Refills: 0 | Status: DISCONTINUED | OUTPATIENT
Start: 2018-02-05 | End: 2018-02-05

## 2018-02-05 RX ORDER — FENTANYL CITRATE 50 UG/ML
50 INJECTION INTRAVENOUS
Qty: 0 | Refills: 0 | Status: DISCONTINUED | OUTPATIENT
Start: 2018-02-05 | End: 2018-02-05

## 2018-02-05 RX ADMIN — Medication 400 MILLIGRAM(S): at 09:17

## 2018-02-05 RX ADMIN — Medication 1000 MILLIGRAM(S): at 09:17

## 2018-02-05 NOTE — BRIEF OPERATIVE NOTE - POST-OP DX
Lateral meniscal tear  02/05/2018    Active  Luis Daniel Norton  Medial meniscus tear  02/05/2018    Active  Luis Daniel Norton  Synovitis  02/05/2018    Active  Luis Daniel Norton

## 2018-02-05 NOTE — ASU DISCHARGE PLAN (ADULT/PEDIATRIC). - MEDICATION SUMMARY - MEDICATIONS TO TAKE
I will START or STAY ON the medications listed below when I get home from the hospital:    aspirin 325 mg oral tablet  -- 1 tab by mouth once a day (LAST DOSE 7/9)  -- Indication: For Prior med    oxyCODONE-acetaminophen 5 mg-325 mg oral tablet  -- 1 tab(s) by mouth every 4 hours, As Needed -for severe pain MDD:6  -- Caution federal law prohibits the transfer of this drug to any person other  than the person for whom it was prescribed.  May cause drowsiness.  Alcohol may intensify this effect.  Use care when operating dangerous machinery.  This prescription cannot be refilled.  This product contains acetaminophen.  Do not use  with any other product containing acetaminophen to prevent possible liver damage.  Using more of this medication than prescribed may cause serious breathing problems.    -- Indication: For pain    lisinopril 40 mg oral tablet  -- 1 tab(s) by mouth once a day am  -- Indication: For Prior med    metFORMIN 1000 mg oral tablet  -- 1 tab(s) by mouth 2 times a day  -- Indication: For Prior med    atorvastatin 10 mg oral tablet  -- 1 tab(s) by mouth once a day (at bedtime)  -- Indication: For Prior med    metoprolol tartrate 50 mg oral tablet  -- 1 tab(s) by mouth 2 times a day  -- Indication: For Prior med    albuterol 90 mcg/inh inhalation aerosol  -- 2 puff(s) inhaled   a day, As Needed  -- Indication: For Prior med    NIFEdipine 60 mg oral tablet, extended release  -- 1 tab(s) by mouth once a day,am  -- Indication: For Prior med    hydroCHLOROthiazide 25 mg oral tablet  -- 1 tab(s) by mouth once a day am  -- Indication: For Prior med    omeprazole 40 mg oral delayed release capsule  -- 1 cap(s) by mouth once a day am  -- Indication: For Prior med    Flovent  mcg/inh inhalation aerosol  -- 2 puff(s) inhaled 2 times a day, As Needed  -- Indication: For Prior med

## 2018-02-05 NOTE — ASU DISCHARGE PLAN (ADULT/PEDIATRIC). - MEDICATION SUMMARY - MEDICATIONS TO CHANGE
I will SWITCH the dose or number of times a day I take the medications listed below when I get home from the hospital:    acetaminophen-oxycodone 325 mg-5 mg oral tablet  -- 2 tab(s) by mouth every 6 hours, As needed, Moderate Pain (4 - 6) MDD:8

## 2018-02-05 NOTE — ASU DISCHARGE PLAN (ADULT/PEDIATRIC). - NOTIFY
Numbness, tingling/Pain not relieved by Medications/Numbness, color, or temperature change to extremity/Bleeding that does not stop

## 2018-02-05 NOTE — BRIEF OPERATIVE NOTE - PROCEDURE
<<-----Click on this checkbox to enter Procedure Synovectomy  02/05/2018    Active  MORE  Lateral meniscectomy  02/05/2018    Active  [id number]  Medial meniscectomy  02/05/2018    Active  [id number]

## 2018-02-06 LAB — SURGICAL PATHOLOGY FINAL REPORT - CH: SIGNIFICANT CHANGE UP

## 2018-02-07 DIAGNOSIS — I50.9 HEART FAILURE, UNSPECIFIED: ICD-10-CM

## 2018-02-07 DIAGNOSIS — J45.909 UNSPECIFIED ASTHMA, UNCOMPLICATED: ICD-10-CM

## 2018-02-07 DIAGNOSIS — Z79.891 LONG TERM (CURRENT) USE OF OPIATE ANALGESIC: ICD-10-CM

## 2018-02-07 DIAGNOSIS — E66.01 MORBID (SEVERE) OBESITY DUE TO EXCESS CALORIES: ICD-10-CM

## 2018-02-07 DIAGNOSIS — E11.9 TYPE 2 DIABETES MELLITUS WITHOUT COMPLICATIONS: ICD-10-CM

## 2018-02-07 DIAGNOSIS — Z79.84 LONG TERM (CURRENT) USE OF ORAL HYPOGLYCEMIC DRUGS: ICD-10-CM

## 2018-02-07 DIAGNOSIS — M65.9 SYNOVITIS AND TENOSYNOVITIS, UNSPECIFIED: ICD-10-CM

## 2018-02-07 DIAGNOSIS — Z79.82 LONG TERM (CURRENT) USE OF ASPIRIN: ICD-10-CM

## 2018-02-07 DIAGNOSIS — K21.9 GASTRO-ESOPHAGEAL REFLUX DISEASE WITHOUT ESOPHAGITIS: ICD-10-CM

## 2018-02-07 DIAGNOSIS — M94.261 CHONDROMALACIA, RIGHT KNEE: ICD-10-CM

## 2018-02-07 DIAGNOSIS — M25.561 PAIN IN RIGHT KNEE: ICD-10-CM

## 2018-02-07 DIAGNOSIS — I10 ESSENTIAL (PRIMARY) HYPERTENSION: ICD-10-CM

## 2018-02-07 DIAGNOSIS — S83.271A COMPLEX TEAR OF LATERAL MENISCUS, CURRENT INJURY, RIGHT KNEE, INITIAL ENCOUNTER: ICD-10-CM

## 2018-02-07 DIAGNOSIS — Z87.891 PERSONAL HISTORY OF NICOTINE DEPENDENCE: ICD-10-CM

## 2018-02-07 DIAGNOSIS — S83.231A COMPLEX TEAR OF MEDIAL MENISCUS, CURRENT INJURY, RIGHT KNEE, INITIAL ENCOUNTER: ICD-10-CM

## 2018-02-07 DIAGNOSIS — I25.2 OLD MYOCARDIAL INFARCTION: ICD-10-CM
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
## 2018-02-07 DIAGNOSIS — Z90.710 ACQUIRED ABSENCE OF BOTH CERVIX AND UTERUS: ICD-10-CM
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
## 2018-02-13 ENCOUNTER — RX RENEWAL (OUTPATIENT)
Age: 53
End: 2018-02-13

## 2018-06-28 ENCOUNTER — RX RENEWAL (OUTPATIENT)
Age: 53
End: 2018-06-28

## 2018-08-03 ENCOUNTER — RX RENEWAL (OUTPATIENT)
Age: 53
End: 2018-08-03

## 2018-09-07 ENCOUNTER — RX RENEWAL (OUTPATIENT)
Age: 53
End: 2018-09-07

## 2018-11-03 ENCOUNTER — RX RENEWAL (OUTPATIENT)
Age: 53
End: 2018-11-03

## 2019-09-02 PROBLEM — Z09 FOLLOW UP: Status: ACTIVE | Noted: 2017-08-01

## 2020-02-03 NOTE — ASU PATIENT PROFILE, ADULT - CLICK TO LAUNCH ORM
PACU ANESTHESIA ADMISSION NOTE      Procedure: Lumpectomy of right breast after needle localization    Post op diagnosis:  Intraductal papilloma of right breast      ____  Intubated  TV:______       Rate: ______      FiO2: ______    _x___  Patent Airway    _x___  Full return of protective reflexes    __x__  Full recovery from anesthesia / back to baseline     Vitals:   T:  98.1         R:   12               BP: 95/51                 Sat:    98               P: 65      Mental Status:  __x__ Awake   ___x__ Alert   _____ Drowsy   _____ Sedated    Nausea/Vomiting:  ___x_ NO  ______Yes,   See Post - Op Orders          Pain Scale (0-10):  _____    Treatment: ___x_ None    ____ See Post - Op/PCA Orders    Post - Operative Fluids:   ____ Oral   __x_ See Post - Op Orders    Plan: Discharge:   ___x_Home       _____Floor     _____Critical Care    _____  Other:_________________    Comments:    Pt bought to RR spontaneously breathing, hemodynamically stable .

## 2020-02-05 PROBLEM — E78.5 HYPERLIPIDEMIA, UNSPECIFIED: Chronic | Status: ACTIVE | Noted: 2017-06-30

## 2020-02-05 PROBLEM — E11.9 TYPE 2 DIABETES MELLITUS WITHOUT COMPLICATIONS: Chronic | Status: ACTIVE | Noted: 2017-06-30

## 2020-02-05 PROBLEM — I10 ESSENTIAL (PRIMARY) HYPERTENSION: Chronic | Status: ACTIVE | Noted: 2017-06-30

## 2020-02-05 PROBLEM — I25.2 OLD MYOCARDIAL INFARCTION: Chronic | Status: ACTIVE | Noted: 2017-06-30

## 2020-02-05 PROBLEM — J45.909 UNSPECIFIED ASTHMA, UNCOMPLICATED: Chronic | Status: ACTIVE | Noted: 2017-06-30

## 2020-02-05 PROBLEM — I50.9 HEART FAILURE, UNSPECIFIED: Chronic | Status: ACTIVE | Noted: 2017-06-30

## 2020-02-05 PROBLEM — E66.9 OBESITY, UNSPECIFIED: Chronic | Status: ACTIVE | Noted: 2017-06-30

## 2020-02-19 ENCOUNTER — OUTPATIENT (OUTPATIENT)
Dept: OUTPATIENT SERVICES | Facility: HOSPITAL | Age: 55
LOS: 1 days | Discharge: HOME | End: 2020-02-19
Payer: OTHER MISCELLANEOUS

## 2020-02-19 VITALS
RESPIRATION RATE: 18 BRPM | TEMPERATURE: 98 F | SYSTOLIC BLOOD PRESSURE: 122 MMHG | HEART RATE: 80 BPM | HEIGHT: 61 IN | DIASTOLIC BLOOD PRESSURE: 80 MMHG | WEIGHT: 260.15 LBS | OXYGEN SATURATION: 97 %

## 2020-02-19 DIAGNOSIS — S83.242D OTHER TEAR OF MEDIAL MENISCUS, CURRENT INJURY, LEFT KNEE, SUBSEQUENT ENCOUNTER: ICD-10-CM

## 2020-02-19 DIAGNOSIS — Z01.818 ENCOUNTER FOR OTHER PREPROCEDURAL EXAMINATION: ICD-10-CM

## 2020-02-19 DIAGNOSIS — Z98.891 HISTORY OF UTERINE SCAR FROM PREVIOUS SURGERY: Chronic | ICD-10-CM

## 2020-02-19 DIAGNOSIS — Z90.710 ACQUIRED ABSENCE OF BOTH CERVIX AND UTERUS: Chronic | ICD-10-CM

## 2020-02-19 DIAGNOSIS — E89.0 POSTPROCEDURAL HYPOTHYROIDISM: Chronic | ICD-10-CM

## 2020-02-19 LAB
ANION GAP SERPL CALC-SCNC: 10 MMOL/L — SIGNIFICANT CHANGE UP (ref 5–17)
APTT BLD: 30 SEC — SIGNIFICANT CHANGE UP (ref 27.5–36.3)
BUN SERPL-MCNC: 20 MG/DL — SIGNIFICANT CHANGE UP (ref 7–23)
CALCIUM SERPL-MCNC: 9.8 MG/DL — SIGNIFICANT CHANGE UP (ref 8.5–10.1)
CHLORIDE SERPL-SCNC: 107 MMOL/L — SIGNIFICANT CHANGE UP (ref 96–108)
CO2 SERPL-SCNC: 22 MMOL/L — SIGNIFICANT CHANGE UP (ref 22–31)
CREAT SERPL-MCNC: 1.5 MG/DL — HIGH (ref 0.5–1.3)
GLUCOSE SERPL-MCNC: 156 MG/DL — HIGH (ref 70–99)
HCT VFR BLD CALC: 40.4 % — SIGNIFICANT CHANGE UP (ref 34.5–45)
HGB BLD-MCNC: 13.2 G/DL — SIGNIFICANT CHANGE UP (ref 11.5–15.5)
INR BLD: 0.99 RATIO — SIGNIFICANT CHANGE UP (ref 0.88–1.16)
MCHC RBC-ENTMCNC: 27.7 PG — SIGNIFICANT CHANGE UP (ref 27–34)
MCHC RBC-ENTMCNC: 32.7 GM/DL — SIGNIFICANT CHANGE UP (ref 32–36)
MCV RBC AUTO: 84.9 FL — SIGNIFICANT CHANGE UP (ref 80–100)
NRBC # BLD: 0 /100 WBCS — SIGNIFICANT CHANGE UP (ref 0–0)
PLATELET # BLD AUTO: 245 K/UL — SIGNIFICANT CHANGE UP (ref 150–400)
POTASSIUM SERPL-MCNC: 3.6 MMOL/L — SIGNIFICANT CHANGE UP (ref 3.5–5.3)
POTASSIUM SERPL-SCNC: 3.6 MMOL/L — SIGNIFICANT CHANGE UP (ref 3.5–5.3)
PROTHROM AB SERPL-ACNC: 11.1 SEC — SIGNIFICANT CHANGE UP (ref 10–12.9)
RBC # BLD: 4.76 M/UL — SIGNIFICANT CHANGE UP (ref 3.8–5.2)
RBC # FLD: 13.6 % — SIGNIFICANT CHANGE UP (ref 10.3–14.5)
SODIUM SERPL-SCNC: 139 MMOL/L — SIGNIFICANT CHANGE UP (ref 135–145)
WBC # BLD: 8.5 K/UL — SIGNIFICANT CHANGE UP (ref 3.8–10.5)
WBC # FLD AUTO: 8.5 K/UL — SIGNIFICANT CHANGE UP (ref 3.8–10.5)

## 2020-02-19 PROCEDURE — 93010 ELECTROCARDIOGRAM REPORT: CPT

## 2020-02-19 RX ORDER — OMEPRAZOLE 10 MG/1
1 CAPSULE, DELAYED RELEASE ORAL
Qty: 0 | Refills: 0 | DISCHARGE

## 2020-02-19 RX ORDER — SODIUM CHLORIDE 9 MG/ML
3 INJECTION INTRAMUSCULAR; INTRAVENOUS; SUBCUTANEOUS EVERY 8 HOURS
Refills: 0 | Status: DISCONTINUED | OUTPATIENT
Start: 2020-02-24 | End: 2020-03-11

## 2020-02-19 NOTE — H&P PST ADULT - NSICDXPASTSURGICALHX_GEN_ALL_CORE_FT
PAST SURGICAL HISTORY:  S/P  section x 2    S/P hysterectomy     S/P thyroidectomy partial - benign mass- 2017

## 2020-02-19 NOTE — H&P PST ADULT - ASSESSMENT
54 year old female with a past medical history of hypertension, MI , CHF, asthma, diabetes was injured at work on 16. She c/o pain, swelling, and limited ROM to her left knee, she is scheduled for a left knee arthroscopy on 2020.    CAPRINI SCORE [CLOT]    AGE RELATED RISK FACTORS                                                       MOBILITY RELATED FACTORS  [x ] Age 41-60 years                                            (1 Point)                  [ ] Bed rest                                                        (1 Point)  [ ] Age: 61-74 years                                           (2 Points)                 [ ] Plaster cast                                                   (2 Points)  [ ] Age= 75 years                                              (3 Points)                 [ ] Bed bound for more than 72 hours                 (2 Points)    DISEASE RELATED RISK FACTORS                                               GENDER SPECIFIC FACTORS  [ ] Edema in the lower extremities                       (1 Point)                  [ ] Pregnancy                                                     (1 Point)  [ ] Varicose veins                                               (1 Point)                  [ ] Post-partum < 6 weeks                                   (1 Point)             [x ] BMI > 25 Kg/m2                                            (1 Point)                  [ ] Hormonal therapy  or oral contraception          (1 Point)                 [ ] Sepsis (in the previous month)                        (1 Point)                  [ ] History of pregnancy complications                 (1 point)  [ ] Pneumonia or serious lung disease                                               [ ] Unexplained or recurrent                     (1 Point)           (in the previous month)                               (1 Point)  [ ] Abnormal pulmonary function test                     (1 Point)                 SURGERY RELATED RISK FACTORS  [ ] Acute myocardial infarction                              (1 Point)                 [ ]  Section                                             (1 Point)  [ ] Congestive heart failure (in the previous month)  (1 Point)               [ ] Minor surgery                                                  (1 Point)   [ ] Inflammatory bowel disease                             (1 Point)                 [ x] Arthroscopic surgery                                        (2 Points)  [ ] Central venous access                                      (2 Points)                [ ] General surgery lasting more than 45 minutes   (2 Points)       [ ] Stroke (in the previous month)                          (5 Points)               [ ] Elective arthroplasty                                         (5 Points)                                                                                                                                               HEMATOLOGY RELATED FACTORS                                                 TRAUMA RELATED RISK FACTORS  [ ] Prior episodes of VTE                                     (3 Points)                [ ] Fracture of the hip, pelvis, or leg                       (5 Points)  [ ] Positive family history for VTE                         (3 Points)                 [ ] Acute spinal cord injury (in the previous month)  (5 Points)  [ ] Prothrombin 31280 A                                     (3 Points)                 [ ] Paralysis  (less than 1 month)                             (5 Points)  [ ] Factor V Leiden                                             (3 Points)                  [ ] Multiple Trauma within 1 month                        (5 Points)  [ ] Lupus anticoagulants                                     (3 Points)                                                           [ ] Anticardiolipin antibodies                               (3 Points)                                                       [ ] High homocysteine in the blood                      (3 Points)                                             [ ] Other congenital or acquired thrombophilia      (3 Points)                                                [ ] Heparin induced thrombocytopenia                  (3 Points)                                          Total Score [     4     ]    Caprini Score 0 - 2:  Low Risk, No VTE Prophylaxis required for most patients, encourage ambulation  Caprini Score 3 - 6:  At Risk, pharmacologic VTE prophylaxis is indicated for most patients (in the absence of a contraindication)  Caprini Score Greater than or = 7:  High Risk, pharmacologic VTE prophylaxis is indicated for most patients (in the absence of a contraindication)

## 2020-02-19 NOTE — H&P PST ADULT - NSICDXPASTMEDICALHX_GEN_ALL_CORE_FT
PAST MEDICAL HISTORY:  Asthma     CHF (congestive heart failure)     Diabetes mellitus     GERD (gastroesophageal reflux disease)     HLD (hyperlipidemia)     Hypertension     MI, old at age 41    Obesity     Smoking history quit 5+ years ago    Tendon tear on each side of right knee

## 2020-02-19 NOTE — H&P PST ADULT - NSICDXPROBLEM_GEN_ALL_CORE_FT
PROBLEM DIAGNOSES  Problem: Other tear of medial meniscus, current injury, left knee, subsequent encounter  Assessment and Plan: Left knee arthroscopy on 2/242/2020

## 2020-02-19 NOTE — H&P PST ADULT - HISTORY OF PRESENT ILLNESS
54 year old female with a past medical history of hypertension, MI 2004, CHF, asthma, diabetes was injured at work on 1/27/16. She c/o pain, swelling, and limited ROM to her left knee, she is scheduled for a left knee arthroscopy on 2/24/2020.

## 2020-02-23 ENCOUNTER — TRANSCRIPTION ENCOUNTER (OUTPATIENT)
Age: 55
End: 2020-02-23

## 2020-02-23 NOTE — ASU DISCHARGE PLAN (ADULT/PEDIATRIC) - CALL YOUR DOCTOR IF YOU HAVE ANY OF THE FOLLOWING:
Pain not relieved by Medications/Swelling that gets worse/Fever greater than (need to indicate Fahrenheit or Celsius)/Wound/Surgical Site with redness, or foul smelling discharge or pus/Bleeding that does not stop

## 2020-02-23 NOTE — ASU DISCHARGE PLAN (ADULT/PEDIATRIC) - CARE PROVIDER_API CALL
William Byrne (DO)  Orthopaedic Surgery  [address] [place] [address]  Phone: [phone]  Fax: [phone]  Follow Up Time:

## 2020-02-23 NOTE — ASU DISCHARGE PLAN (ADULT/PEDIATRIC) - ASU DC SPECIAL INSTRUCTIONSFT
Follow up with Dr Byrne in 7-10 days. Call office for appointment. Take medications as prescribed. Keep dressing clean, dry, and intact. Rest, ice, and elevate affected extremity.

## 2020-02-24 ENCOUNTER — OUTPATIENT (OUTPATIENT)
Dept: OUTPATIENT SERVICES | Facility: HOSPITAL | Age: 55
LOS: 1 days | Discharge: HOME | End: 2020-02-24

## 2020-02-24 VITALS
TEMPERATURE: 97 F | SYSTOLIC BLOOD PRESSURE: 123 MMHG | OXYGEN SATURATION: 100 % | RESPIRATION RATE: 16 BRPM | DIASTOLIC BLOOD PRESSURE: 65 MMHG | HEART RATE: 75 BPM

## 2020-02-24 VITALS
TEMPERATURE: 99 F | SYSTOLIC BLOOD PRESSURE: 118 MMHG | OXYGEN SATURATION: 100 % | WEIGHT: 260.15 LBS | RESPIRATION RATE: 14 BRPM | DIASTOLIC BLOOD PRESSURE: 70 MMHG | HEIGHT: 61 IN | HEART RATE: 80 BPM

## 2020-02-24 DIAGNOSIS — I10 ESSENTIAL (PRIMARY) HYPERTENSION: ICD-10-CM
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
## 2020-02-24 DIAGNOSIS — S83.232A COMPLEX TEAR OF MEDIAL MENISCUS, CURRENT INJURY, LEFT KNEE, INITIAL ENCOUNTER: ICD-10-CM

## 2020-02-24 DIAGNOSIS — M65.9 SYNOVITIS AND TENOSYNOVITIS, UNSPECIFIED: ICD-10-CM

## 2020-02-24 DIAGNOSIS — M94.262 CHONDROMALACIA, LEFT KNEE: ICD-10-CM
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
## 2020-02-24 DIAGNOSIS — I50.9 HEART FAILURE, UNSPECIFIED: ICD-10-CM
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
## 2020-02-24 DIAGNOSIS — E11.9 TYPE 2 DIABETES MELLITUS WITHOUT COMPLICATIONS: ICD-10-CM

## 2020-02-24 DIAGNOSIS — Z79.84 LONG TERM (CURRENT) USE OF ORAL HYPOGLYCEMIC DRUGS: ICD-10-CM

## 2020-02-24 DIAGNOSIS — Z98.891 HISTORY OF UTERINE SCAR FROM PREVIOUS SURGERY: Chronic | ICD-10-CM

## 2020-02-24 DIAGNOSIS — J45.909 UNSPECIFIED ASTHMA, UNCOMPLICATED: ICD-10-CM

## 2020-02-24 DIAGNOSIS — Z79.82 LONG TERM (CURRENT) USE OF ASPIRIN: ICD-10-CM

## 2020-02-24 DIAGNOSIS — E66.01 MORBID (SEVERE) OBESITY DUE TO EXCESS CALORIES: ICD-10-CM

## 2020-02-24 DIAGNOSIS — E78.5 HYPERLIPIDEMIA, UNSPECIFIED: ICD-10-CM

## 2020-02-24 DIAGNOSIS — I25.2 OLD MYOCARDIAL INFARCTION: ICD-10-CM

## 2020-02-24 DIAGNOSIS — Z90.710 ACQUIRED ABSENCE OF BOTH CERVIX AND UTERUS: Chronic | ICD-10-CM

## 2020-02-24 DIAGNOSIS — K21.9 GASTRO-ESOPHAGEAL REFLUX DISEASE WITHOUT ESOPHAGITIS: ICD-10-CM

## 2020-02-24 DIAGNOSIS — Z87.891 PERSONAL HISTORY OF NICOTINE DEPENDENCE: ICD-10-CM

## 2020-02-24 DIAGNOSIS — E89.0 POSTPROCEDURAL HYPOTHYROIDISM: Chronic | ICD-10-CM
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
## 2020-02-24 LAB — GLUCOSE BLDC GLUCOMTR-MCNC: 131 MG/DL — HIGH (ref 70–99)

## 2020-02-24 RX ORDER — HYDROMORPHONE HYDROCHLORIDE 2 MG/ML
0.5 INJECTION INTRAMUSCULAR; INTRAVENOUS; SUBCUTANEOUS
Refills: 0 | Status: DISCONTINUED | OUTPATIENT
Start: 2020-02-24 | End: 2020-02-24

## 2020-02-24 RX ORDER — HYDROMORPHONE HYDROCHLORIDE 2 MG/ML
1 INJECTION INTRAMUSCULAR; INTRAVENOUS; SUBCUTANEOUS
Refills: 0 | Status: DISCONTINUED | OUTPATIENT
Start: 2020-02-24 | End: 2020-02-24

## 2020-02-24 RX ORDER — FLUTICASONE PROPIONATE 220 MCG
2 AEROSOL WITH ADAPTER (GRAM) INHALATION
Qty: 0 | Refills: 0 | DISCHARGE

## 2020-02-24 RX ORDER — ASPIRIN/CALCIUM CARB/MAGNESIUM 324 MG
1 TABLET ORAL
Qty: 0 | Refills: 0 | DISCHARGE

## 2020-02-24 RX ORDER — METOPROLOL TARTRATE 50 MG
1 TABLET ORAL
Qty: 0 | Refills: 0 | DISCHARGE

## 2020-02-24 RX ORDER — ONDANSETRON 8 MG/1
4 TABLET, FILM COATED ORAL ONCE
Refills: 0 | Status: DISCONTINUED | OUTPATIENT
Start: 2020-02-24 | End: 2020-02-24

## 2020-02-24 RX ORDER — SODIUM CHLORIDE 9 MG/ML
1000 INJECTION, SOLUTION INTRAVENOUS
Refills: 0 | Status: DISCONTINUED | OUTPATIENT
Start: 2020-02-24 | End: 2020-02-24

## 2020-02-24 RX ORDER — ALBUTEROL 90 UG/1
2 AEROSOL, METERED ORAL
Qty: 0 | Refills: 0 | DISCHARGE

## 2020-02-24 RX ORDER — ATORVASTATIN CALCIUM 80 MG/1
1 TABLET, FILM COATED ORAL
Qty: 0 | Refills: 0 | DISCHARGE

## 2020-02-24 RX ORDER — INSULIN DEGLUDEC 100 U/ML
12 INJECTION, SOLUTION SUBCUTANEOUS
Qty: 0 | Refills: 0 | DISCHARGE

## 2020-02-24 RX ORDER — LISINOPRIL 2.5 MG/1
1 TABLET ORAL
Qty: 0 | Refills: 0 | DISCHARGE

## 2020-02-24 RX ORDER — METFORMIN HYDROCHLORIDE 850 MG/1
1 TABLET ORAL
Qty: 0 | Refills: 0 | DISCHARGE

## 2020-02-24 RX ORDER — DULAGLUTIDE 4.5 MG/.5ML
0 INJECTION, SOLUTION SUBCUTANEOUS
Qty: 0 | Refills: 0 | DISCHARGE

## 2020-02-24 RX ORDER — NIFEDIPINE 30 MG
1 TABLET, EXTENDED RELEASE 24 HR ORAL
Qty: 0 | Refills: 0 | DISCHARGE

## 2020-02-24 RX ADMIN — HYDROMORPHONE HYDROCHLORIDE 0.5 MILLIGRAM(S): 2 INJECTION INTRAMUSCULAR; INTRAVENOUS; SUBCUTANEOUS at 10:30

## 2020-02-24 RX ADMIN — SODIUM CHLORIDE 150 MILLILITER(S): 9 INJECTION, SOLUTION INTRAVENOUS at 09:41

## 2020-02-24 RX ADMIN — HYDROMORPHONE HYDROCHLORIDE 1 MILLIGRAM(S): 2 INJECTION INTRAMUSCULAR; INTRAVENOUS; SUBCUTANEOUS at 09:55

## 2020-02-24 RX ADMIN — HYDROMORPHONE HYDROCHLORIDE 1 MILLIGRAM(S): 2 INJECTION INTRAMUSCULAR; INTRAVENOUS; SUBCUTANEOUS at 09:40

## 2020-02-24 RX ADMIN — HYDROMORPHONE HYDROCHLORIDE 0.5 MILLIGRAM(S): 2 INJECTION INTRAMUSCULAR; INTRAVENOUS; SUBCUTANEOUS at 10:14

## 2020-02-24 NOTE — ASU PATIENT PROFILE, ADULT - PMH
Asthma    CHF (congestive heart failure)    Diabetes mellitus    HLD (hyperlipidemia)    Hypertension    MI, old  at age 41  Obesity    Smoking history  quit 5+ years ago  Tendon tear  on each side of right knee

## 2020-03-14 NOTE — H&P PST ADULT - LYMPHATIC
ADMIT
posterior cervical L/supraclavicular R/supraclavicular L/anterior cervical L/anterior cervical R/posterior cervical R

## 2020-05-27 NOTE — H&P PST ADULT - ATTENDING COMMENTS
Patient Mother (Macy) called into the office, Patient requesting a call back.     Best Contact:  [phone]  Reason for Call:  Mother called again to discuss the shots. She stated that Daron Liang signed a form that allowed us to remix his serum do to them either expiring or running low- Either way they dont want to remix any longer because they need to look at a different option for Papito. He gets way too Tired and he is unable to function at work. He cant keep missing work and there is no other alternant for him.     She is interested in Sublinguel Therapy if this would be a option for him? She was told in the past that [facility] doesn't do this method or due to his allergies it wasn't available. She was wondering if this has changed?    She is requesting a call back to discuss further.     Thank you!    Agree with above

## 2021-06-24 PROBLEM — Z87.891 PERSONAL HISTORY OF NICOTINE DEPENDENCE: Chronic | Status: ACTIVE | Noted: 2017-06-30

## 2021-07-29 ENCOUNTER — NON-APPOINTMENT (OUTPATIENT)
Age: 56
End: 2021-07-29

## 2021-07-29 ENCOUNTER — APPOINTMENT (OUTPATIENT)
Dept: ORTHOPEDIC SURGERY | Facility: CLINIC | Age: 56
End: 2021-07-29
Payer: MEDICAID

## 2021-07-29 DIAGNOSIS — M17.0 BILATERAL PRIMARY OSTEOARTHRITIS OF KNEE: ICD-10-CM

## 2021-07-29 DIAGNOSIS — M95.8 OTHER SPECIFIED ACQUIRED DEFORMITIES OF MUSCULOSKELETAL SYSTEM: ICD-10-CM

## 2021-07-29 PROCEDURE — 99204 OFFICE O/P NEW MOD 45 MIN: CPT

## 2021-07-30 NOTE — ADDENDUM
[FreeTextEntry1] : Documented by Jomar Choi acting as a scribe for Dr. Barron on 07/29/2021. \par \par All medical record entries made by the Scribe were at my, Dr. Barron's, direction and\par personally dictated by me on 07/29/2021. I have reviewed the chart and agree that the record\par accurately reflects my personal performance of the history, physical exam, procedure and imaging.

## 2021-07-30 NOTE — HISTORY OF PRESENT ILLNESS
[Stable] : stable [___ yrs] : [unfilled] year(s) ago [3] : an average pain level of 3/10 [Walking] : worsened by walking [Knee Flexion] : worsened with knee flexion [Knee Extension] : worsened with knee extension [de-identified] : GREG BOTELLO is a 56 year y/o female who presents for initial visit of bilateral knees (L>R). Patient states symptoms started 1 year ago. She reports specific ANABELL as falling in [employer]. The pain substantially limits activities of daily living. Walking tolerance is reduced.  Patient was referred by Dr. Luo. Of note, patient had 2 prior knee surgery, in each knee. She reports having several injection by her pain management, oxycodone, and zanaf. She reports cortisone injection, gel injection with mild to no relief. She presents with MRI. MRI demonstrates: \par \par \par \par

## 2021-07-30 NOTE — DISCUSSION/SUMMARY
[de-identified] : GREG BOTELLO is a 56 year y/o female who presents for initial visit of bilateral knees (L>R). Patient states symptoms started 1 year ago. She reports specific ANABELL as falling in [employer]. The pain substantially limits activities of daily living. Walking tolerance is reduced.  Patient was referred by Dr. Luo. Of note, patient had 2 prior knee surgery, in each knee. She reports having several injection by her pain management, oxycodone, and zanaf. She reports cortisone injection, gel injection with mild to no relief. She presents with MRI. MRI demonstrates: \par \par We had a thorough discussion regarding the nature of her pain, the pathophysiology, as well as all treatment options. Based on her clinical exam, radiographs, and MRI, she has meniscus tearing along with advance arthritis. She was referred to me for questionable meniscus repair. I discussed that operative treatment for meniscus repair will no alleviate all of her symptoms. I have referred her to my joint replacement surgeon [facility] to see if she is a candidate for joint replacement. All questions were answered and the patient verbalized understanding. The patient is in agreement with this treatment plan.

## 2021-07-30 NOTE — PHYSICAL EXAM
[de-identified] : Physical Exam:\par General: Well appearing, no acute distress\par Neurologic: A&Ox3, No focal deficits\par Head: NCAT without abrasions, lacerations, or ecchymosis to head, face, or scalp\par Eyes: No scleral icterus, no gross abnormalities\par Respiratory: Equal chest wall expansion bilaterally, no accessory muscle use\par Lymphatic: No lymphadenopathy palpated\par Skin: Warm and dry\par Psychiatric: Normal mood and affect\par \par Bilateral knee: There is moderate effusion. Range of motion is 0-120°. Painful at the extremes of range of motion. \par There is medial and lateral joint line tenderness. \par Quadriceps strength is 4/5. There is some muscle loss in the quadriceps. \par Anteroposterior and mediolateral stability is intact Ulises test is negative. Alignment of the knee is in 5° of varus.  [de-identified] : The following radiographs were ordered and read by me during this patients visit. I reviewed each radiograph in detail with the patient and discussed the findings as highlighted below. \par \par 3 views of the bilateral knee show medial compartment joint space narrowing, otherwise,  no fracture, dislocation or bony lesions. \par \par \par Procedure: MRI of \par Dated: \par \par Impression:\par \par

## 2021-08-21 ENCOUNTER — TRANSCRIPTION ENCOUNTER (OUTPATIENT)
Age: 56
End: 2021-08-21

## 2021-12-07 ENCOUNTER — APPOINTMENT (OUTPATIENT)
Dept: MRI IMAGING | Facility: CLINIC | Age: 56
End: 2021-12-07

## 2022-01-17 NOTE — H&P PST ADULT - REASON FOR ADMISSION
RX refill request from the patient/pharmacy. Patient last seen 10- with labs, and next appt. scheduled for 01-  Requested Prescriptions     Pending Prescriptions Disp Refills    synthroid 50 mcg tablet [Pharmacy Med Name: SYNTHROID 0.05MG (50MCG)TABLETS] 90 Tablet 3     Sig: TAKE 1 TABLET BY MOUTH DAILY   .
left knee arthroscopy

## 2022-06-28 NOTE — ASU PREOP CHECKLIST - HEIGHT IN FEET
"Dear Juice Dorsey MD     Christy Albright is being seen on 6/28/2022 in the clinic for a follow up visit. HISTORY OF PRESENT ILLNESS:    Christy Albright is a pleasant 73year old right-handed  woman with idiopathic painful peripheral neuropathy. She continues to describe her neuropathic pain as burning sensation and ""burning waves of pain\"" in her feet, left greater than right and distal legs which only lasts a few seconds. She lately reported experiencing similar pain in her hands as well, perhaps slightly worse since last evaluation. Occasionally she would experience sharp shooting pain in her feet, her left anterior foot worse than the left side. She continues to take Cymbalta 60 mg daily (5-6 pm), gabapentin 300 (8am)-300 (1pm)-600 (5-6pm)-600 mg (10-11 pm )daily. She notes that the neuropathic pain worsens in the early evening about 4-5 times per week. She will occasionally wake up in the night, about 0737-8491 and she performs deep breathing, exercises  and massage to reduce the discomfort so she is able to fall back asleep. She uses a rollator walker and occasionally a \""stick\"" for walking but this increases the pain in her hands. She plans to discuss her concerns regarding arthritis with her PCP. She denies side effects to the Cymbalta and gabapentin. She denies breathing and swallowing concerns. She denies blurred vision and double vision. She denies recurrence of glossopharyngeal neuralgia pain. She denies dizziness and room spinning sensation. She denies significant back and neck pain. Her weight has remained stable. She denies tremors. She continues to experience intermittent twitching, but has remained stable. She has attempted to increase her activity level, but this causes her increase neuropathic pain. She denies bladder dysfunction. She has intermittent balance issues but uses a walker for long distances. She does not use it in the home. She denies recent falls.   She denies " depression and anxiety. She denies insomnia. Her most recent HgA1c is 6.1%.  TSH and creatinine normal, B12 and folate are normal.       PAST MEDICAL HISTORY:      Dyslipidemia                                                  Lower extremity edema                                         Obesity                                                       Elevated blood sugar                                          Colon polyps                                                  Bronchial asthma                                              GERD (gastroesophageal reflux disease)                        Plantar fasciitis                                             Impaired fasting glucose                                      Diastolic dysfunction                                         HTN (hypertension)                                            Allergic rhinitis                                             Disorder of bone and cartilage, unspecified     10/06/2009    Blocked tear duct                                             H/O  section                                            Comment: x 2    Foot pain                                                     Difficulty in walking(719.7)                                  Rash                                                          High cholesterol                                              Sinusitis, chronic                                            Urinary tract infection                                       Chronic pain                                                  Ovarian cystadenoma                                           PMB (postmenopausal bleeding)                                 Breast lump in female                                         Vaginal itching                                               Vitamin D deficiency                                          Vitamin B12 deficiency                                        Chronic kidney disease Comment: CKD2    Multiple thyroid nodules                                        Comment: nodules    Dry eye syndrome                                              Past Surgical History:   Procedure Laterality Date   â¢ Biopsy of thyroid,percut      Thyroid nodule biopsy   â¢  section, classic     â¢  section, low transverse      x2   â¢ Cholecystectomy     â¢ Colonoscopy      With polypectomy   â¢ Cyst removal Right     Right knee   â¢ Dexa bone density axial skeleton  10/06/2009   â¢ Eye surgery     â¢ Hysteroscopy,bio endo/polyptmy Left 2017   â¢ Ovarian cyst removal     â¢ Removal gallbladder     â¢ Sinus surgery proc unlisted  2012    enlarge breathing area   â¢ Skin biopsy     â¢ Tear duct surgery      stents placed for several months then removed   â¢ Tonsillectomy and adenoidectomy     â¢ Upper gi endoscopy,exam         Social History     Socioeconomic History   â¢ Marital status: /Civil Union     Spouse name: Not on file   â¢ Number of children: 2   â¢ Years of education: Not on file   â¢ Highest education level: Not on file   Occupational History   â¢ Occupation:      Comment: Hospice Care   Tobacco Use   â¢ Smoking status: Never Smoker   â¢ Smokeless tobacco: Never Used   Substance and Sexual Activity   â¢ Alcohol use:  Yes     Alcohol/week: 0.0 standard drinks     Comment: ON OCCASION   â¢ Drug use: No   â¢ Sexual activity: Yes     Partners: Male     Birth control/protection: None     Comment:  40 years   Other Topics Concern   â¢ Not on file   Social History Narrative   â¢ Not on file     Social Determinants of Health     Financial Resource Strain: Not on file   Food Insecurity: Not on file   Transportation Needs: Not on file   Physical Activity: Not on file   Stress: Not on file   Social Connections: Not on file   Intimate Partner Violence: Not At Risk   â¢ Social Determinants: Intimate Partner Violence Past Fear: No   â¢ Social Determinants: Intimate Partner Violence Current Fear: No       Family History   Problem Relation Age of Onset   â¢ Neurologic Disorder Son    â¢ Musculoskeletal Son    â¢ Heart disease Mother    â¢ Heart disease Sister    â¢ Heart disease Maternal Grandmother    â¢ Diabetes Maternal Grandmother    â¢ Heart disease Maternal Grandfather    â¢ Stroke Paternal Grandfather    â¢ Hypertension Father        ALLERGIES:   Allergen Reactions   â¢ Cat Dander PRURITUS     Watery eyes and congestion   â¢ Dander Other (See Comments)     Watery eyes and congestion  Watery eyes and congestion   â¢ Dog Dander PRURITUS     Watery eyes and congestion   â¢ Dust      Watery eyes, itching, congestion   â¢ Mold   (Environmental) PRURITUS     Watery eyes, congestion   â¢ Molds & Smuts Other (See Comments)     Watery eyes, congestion   â¢ Nuts   (Food) Other (See Comments)     Lips swell   â¢ Pollen      Watery eyes, itching and congestion   â¢ Sulfa Antibiotics SWELLING and RASH   â¢ Sulfa Drugs Cross Reactors RASH and SWELLING   â¢ Tetracyclines & Related RASH and SWELLING   â¢ Thimerosal ARTHRALGIA   â¢ Trees PRURITUS     Watery eyes and congestion   â¢ Walnuts [[place]   (Food)] PRURITUS     Lips swell       Current Outpatient Medications   Medication Sig Dispense Refill   â¢ mupirocin (Bactroban) 2 % ointment Apply topically 3 times daily. 22 g 0   â¢ predniSONE (DELTASONE) 20 MG tablet Take 2 tablets by mouth daily with food x 5 days. 10 tablet 0   â¢ Dextromethorphan-guaiFENesin (Robitussin Cough+Chest Tigre DM) 5-100 MG/5ML Liquid Take 10cc by mouth every 6 hours as needed for cough/congestion. 240 mL 1   â¢ fluticasone propionate (FLOVENT HFA) 110 MCG/ACT inhaler Inhale 1 puff into the lungs 2 times daily. 12 g 3   â¢ albuterol (Proventil HFA) 108 (90 Base) MCG/ACT inhaler Inhale 2 puffs into the lungs every 4 hours as needed for Shortness of Breath or Wheezing.  8.5 g 2   â¢ losartan (COZAAR) 50 MG tablet TAKE 1 TABLET BY MOUTH  DAILY 90 tablet 1   â¢ "atorvastatin (LIPITOR) 10 MG tablet TAKE 1 TABLET BY MOUTH  DAILY 90 tablet 1   â¢ furosemide (LASIX) 20 MG tablet TAKE 1 TABLET BY MOUTH  DAILY 90 tablet 1   â¢ alendronate (Fosamax) 70 MG tablet Take 1 tablet by mouth every 7 days. Do not crush, take on empty stomach with 8 oz glass of plain water, stay upright for at least half an hour after taking tablet. 12 tablet 1   â¢ mupirocin (BACTROBAN) 2 % ointment Apply topically 3 times daily. Apply to the affected area of abdominal wall. 22 g 0   â¢ Misc. Devices (iGen6) Misc 4 wheel heavy duty walker with a seat for fall prevention. Diagnosis:  Peripheral neuropathy (G6 2. 9)  Reason for walker:  Fall prevention    The patient is overweight 136.3 kg and height is 5'4"" 1 each 0   â¢ DULoxetine (CYMBALTA) 60 MG capsule TAKE 1 CAPSULE BY MOUTH  DAILY 90 capsule 3   â¢ gabapentin (NEURONTIN) 300 MG capsule TAKE 1 CAPSULE BY MOUTH IN  THE MORNING, 1 CAPSULE IN  THE AFTERNOON AND 2  CAPSULES IN THE EVENING AND AT 2 capsule  BEDTIME 540 capsule 3   â¢ Lancets (freestyle) Misc Dispense lancets to use with Freestyle Lite Glucometer or one covered by insurance to use to test blood sugar once daily, Dx E11.9 100 each 0   â¢ blood glucose (FREESTYLE LITE) test strip Test blood sugar 1 times daily. Diagnosis: E11.9. Meter: Freestyle Lite or one covered by insurance 100 each 0   â¢ famotidine (PEPCID) 40 MG tablet every 24 hours. â¢ cetirizine (ZyrTEC) 10 MG tablet Take 10 mg by mouth daily. â¢ mometasone (ELOCON) 0.1 % ointment Use 2 times daily for up to 2 weeks per month on both hands and right knee. Can restart as needed for itching. 45 g 2   â¢ Probiotic Product (FLORAJEN3 PO) Take  by mouth daily. â¢ omeprazole (PRILOSEC) 40 MG capsule Take 1 capsule by mouth daily. (Patient taking differently: Take 40 mg by mouth in the morning and 40 mg before bedtime.) 30 capsule 3   â¢ vitamin B-12 (CYANOCOBALAMIN) 1000 MCG tablet Take 1 tablet by mouth daily.  OTC 30 tablet 0   â¢ CALCIUM " PO Take 630 mg by mouth daily. â¢ cholecalciferol (VITAMIN D3) 1000 UNIT tablet Take 5,000 Units by mouth daily. â¢ Daily Multiple Vitamins TABS Take 1 tablet by mouth daily. No current facility-administered medications for this visit. GENERAL REVIEW OF SYSTEM:    Constitutional:  No report of fever or chills   Eyes:  No report of change in visual acuity or blurred vision   HENT:  No report of dysphagia  Respiratory:  No report of shortness of breath   Cardiovascular:  No report of chest pain or pedal edema   Gastrointestinal:  No report of  abdominal pain, nausea, vomiting, constipation or diarrhea   Genitourinary:  No report of bladder dysfunction   Musculoskeletal:  No report of  neck or low back pain   Integument:  No report of  rash   Neurologic:  As described above  Endocrine:  No report of  polyuria or polydipsia   Lymphatic:  No report of swollen glands   Psychiatric:  No report of depression, anxiety or insomnia    PHYSICAL EXAM:    The patient is well-nourished and well developed, in no acute distress. Blood pressure 126/64, pulse 72, weight (!) 137 kg (302 lb). .     NEUROLOGICAL EXAMINATION:    The patient is awake, alert, and oriented x3. No myoclonic jerking noted during exam. Recent and remote memory appear to be intact. The patient's speech is fluent and language is intact. The patient's fund of knowledge is normal.  Pupils are 2 mm round, reactive to light. Funduscopic examination attempted but unable to get a good view of the fundi. Visual fields are full to finger confrontation. Extraocular movements are normal.  There is no nystagmus. Nasolabial folds and facial sensation are intact and symmetric. Tongue and uvula are midline. Palate moves symmetrically. High frequency hearing is intact bilaterally. Shrugging of shoulder is symmetric. There is no pronator drift. Muscle bulk and tone are normal.  Strength is normal in all extremities.   Glove and stocking distribution is impaired to cold temperature. Joint position and vibration perception is intact in feet. No resting or postural tremors noted in her hands. Finger-to-nose examination did not reveal any tremors or dysmetria. Distal pulses in feet normal. No discoloration noted in distal extremities. Deep tendon reflexes are 1 in both upper and absent in lower extremities. Gait examination showed normal base and normal stride without use of assistive device. Romberg is negative. ASSESSMENT AND PLAN:    Eder Mohan a pleasant 73year old right-handed  woman who presents in follow up with idiopathic painful peripheral neuropathy. Her HgA1c remains stable, but elevated at 6.1%. She feels that the symptoms are fairly well controlled since starting Cymbalta. Recommend continuing gabapentin [phone] mg and Cymbalta 60 mg in the evening. She may continue massage and deep breathing exercises for symptomatic relief. Recommend over-the-counter lidocaine ointment for additional symptomatic relief. Discussed possibility of reducing gabapentin but due to pain has elected to remain on current dose despite risk for weight gain. Discussed with her the importance of tight glycemic control to prevent develop superimposed glucose intolerance peripheral neuropathy. 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 Previously I noted mild head and extremity tremor, which I have not noticed during last few evaluations, and possibly previously noted head and hand tremors were result of exacerabation of physiological tremor. She continues to experience occasional myoclonic jerking, which could be from gabapentin. Follow up in one year, earlier should the need arise sooner. Thank you for the opportunity to continue to participate in the care of this patient. Dennis Mack seen in conjunction with the attending neurologist, Dr. Altagracia Silva.     I have examined the patient with [facility], reviewed medical records and pertinent diagnostic studies, and have participated in the development of the treatment plan with Nurse Practitioner. I agree with the documentation stated above. The patient continuing taking Cymbalta and gabapentin. She reported experiencing intermittent episodes of worsening in neuropathic pain in feet, specially at night. She denies hand tremors. 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 Her weight has been stable. Her last CMP showed normal serum creatinine. She occasionally experiences myoclonic jerking. On my examination, her mental status normal.  Strength in extremities normal.  She has no significant edema in lower extremities. Distal pulses and feet are normal.  There is no discoloration in the feet. I did not notice any significant head or upper extremity tremors. Discussed with the patient about importance of avoiding excessive carbohydrate and sugar diet and reducing her weight by dieting and exercising. Recommended using over-the-counter lidocaine ointment on p.r.n. basis over the feet. She is advised continuing on current regimen of gabapentin and Cymbalta.     Jon Kenyon MD    Total split encounter time 32 minutes 5

## 2022-07-28 ENCOUNTER — APPOINTMENT (EMERGENCY)
Dept: CT IMAGING | Facility: HOSPITAL | Age: 57
End: 2022-07-28
Payer: COMMERCIAL

## 2022-07-28 ENCOUNTER — HOSPITAL ENCOUNTER (EMERGENCY)
Facility: HOSPITAL | Age: 57
Discharge: HOME | End: 2022-07-28
Attending: EMERGENCY MEDICINE
Payer: COMMERCIAL

## 2022-07-28 VITALS
SYSTOLIC BLOOD PRESSURE: 172 MMHG | HEART RATE: 66 BPM | BODY MASS INDEX: 41.21 KG/M2 | DIASTOLIC BLOOD PRESSURE: 78 MMHG | TEMPERATURE: 98.5 F | RESPIRATION RATE: 18 BRPM | OXYGEN SATURATION: 99 % | HEIGHT: 61 IN | WEIGHT: 218.26 LBS

## 2022-07-28 DIAGNOSIS — G89.18 POST-OPERATIVE PAIN: ICD-10-CM

## 2022-07-28 DIAGNOSIS — S30.1XXA ABDOMINAL WALL SEROMA, INITIAL ENCOUNTER: Primary | ICD-10-CM

## 2022-07-28 LAB
ANION GAP SERPL CALCULATED.3IONS-SCNC: 13 MMOL/L (ref 4–13)
BASOPHILS # BLD AUTO: 0.03 THOUSANDS/ΜL (ref 0–0.1)
BASOPHILS NFR BLD AUTO: 0 % (ref 0–1)
BUN SERPL-MCNC: 12 MG/DL (ref 5–25)
CALCIUM SERPL-MCNC: 9.8 MG/DL (ref 8.3–10.1)
CHLORIDE SERPL-SCNC: 107 MMOL/L (ref 96–108)
CO2 SERPL-SCNC: 21 MMOL/L (ref 21–32)
CREAT SERPL-MCNC: 1.22 MG/DL (ref 0.6–1.3)
EOSINOPHIL # BLD AUTO: 0.27 THOUSAND/ΜL (ref 0–0.61)
EOSINOPHIL NFR BLD AUTO: 4 % (ref 0–6)
ERYTHROCYTE [DISTWIDTH] IN BLOOD BY AUTOMATED COUNT: 13.6 % (ref 11.6–15.1)
GFR SERPL CREATININE-BSD FRML MDRD: 49 ML/MIN/1.73SQ M
GLUCOSE SERPL-MCNC: 94 MG/DL (ref 65–140)
HCT VFR BLD AUTO: 35.1 % (ref 34.8–46.1)
HGB BLD-MCNC: 11.5 G/DL (ref 11.5–15.4)
IMM GRANULOCYTES # BLD AUTO: 0.02 THOUSAND/UL (ref 0–0.2)
IMM GRANULOCYTES NFR BLD AUTO: 0 % (ref 0–2)
LYMPHOCYTES # BLD AUTO: 1.4 THOUSANDS/ΜL (ref 0.6–4.47)
LYMPHOCYTES NFR BLD AUTO: 19 % (ref 14–44)
MCH RBC QN AUTO: 28.8 PG (ref 26.8–34.3)
MCHC RBC AUTO-ENTMCNC: 32.8 G/DL (ref 31.4–37.4)
MCV RBC AUTO: 88 FL (ref 82–98)
MONOCYTES # BLD AUTO: 0.51 THOUSAND/ΜL (ref 0.17–1.22)
MONOCYTES NFR BLD AUTO: 7 % (ref 4–12)
NEUTROPHILS # BLD AUTO: 5.15 THOUSANDS/ΜL (ref 1.85–7.62)
NEUTS SEG NFR BLD AUTO: 70 % (ref 43–75)
NRBC BLD AUTO-RTO: 0 /100 WBCS
PLATELET # BLD AUTO: 267 THOUSANDS/UL (ref 149–390)
PMV BLD AUTO: 11.2 FL (ref 8.9–12.7)
POTASSIUM SERPL-SCNC: 4.2 MMOL/L (ref 3.5–5.3)
RBC # BLD AUTO: 3.99 MILLION/UL (ref 3.81–5.12)
SODIUM SERPL-SCNC: 141 MMOL/L (ref 135–147)
WBC # BLD AUTO: 7.38 THOUSAND/UL (ref 4.31–10.16)

## 2022-07-28 PROCEDURE — 96374 THER/PROPH/DIAG INJ IV PUSH: CPT

## 2022-07-28 PROCEDURE — 99285 EMERGENCY DEPT VISIT HI MDM: CPT | Performed by: EMERGENCY MEDICINE

## 2022-07-28 PROCEDURE — 80048 BASIC METABOLIC PNL TOTAL CA: CPT | Performed by: EMERGENCY MEDICINE

## 2022-07-28 PROCEDURE — G1004 CDSM NDSC: HCPCS

## 2022-07-28 PROCEDURE — 99284 EMERGENCY DEPT VISIT MOD MDM: CPT

## 2022-07-28 PROCEDURE — 36415 COLL VENOUS BLD VENIPUNCTURE: CPT | Performed by: EMERGENCY MEDICINE

## 2022-07-28 PROCEDURE — 74177 CT ABD & PELVIS W/CONTRAST: CPT

## 2022-07-28 PROCEDURE — 85025 COMPLETE CBC W/AUTO DIFF WBC: CPT | Performed by: EMERGENCY MEDICINE

## 2022-07-28 RX ORDER — MORPHINE SULFATE 4 MG/ML
4 INJECTION, SOLUTION INTRAMUSCULAR; INTRAVENOUS ONCE
Status: COMPLETED | OUTPATIENT
Start: 2022-07-28 | End: 2022-07-28

## 2022-07-28 RX ADMIN — IOHEXOL 70 ML: 350 INJECTION, SOLUTION INTRAVENOUS at 14:45

## 2022-07-28 RX ADMIN — MORPHINE SULFATE 4 MG: 4 INJECTION INTRAVENOUS at 14:28

## 2022-07-28 NOTE — ED PROVIDER NOTES
History  Chief Complaint   Patient presents with    Post-op Problem     Patient states she is one week s/p hernia surgery from [facility] in [place] and is now having increased abdominal pain and decreased output from her melita drain  Patient presents to the ER 1 week post op of U  Hernia repair  Worried about possible infection  No fever no chills, just hot/cold feeling  Pain at site 9 5/10, came in via EMS  Had sx at [facility] by weightloss surgery  Pain constant, severe, throbbing/sharp  Pt has a drain and has had decreased output last 24 hours     Patient does not smoke, drinks alcohol occasionally and uses recreational marijuana  Surgical history of bariatric surgery,  x2, shoulder surgery, left thyroid        History provided by:  Patient   used: No        None       Past Medical History:   Diagnosis Date    Hernia of abdominal cavity        No past surgical history on file  No family history on file  I have reviewed and agree with the history as documented  E-Cigarette/Vaping     E-Cigarette/Vaping Substances          Review of Systems   Constitutional: Negative for chills and fever  HENT: Negative for ear pain and sore throat  Eyes: Negative for pain and visual disturbance  Respiratory: Negative for cough and shortness of breath  Cardiovascular: Negative for chest pain and palpitations  Gastrointestinal: Positive for abdominal pain  Negative for vomiting  Genitourinary: Negative for dysuria and hematuria  Musculoskeletal: Negative for arthralgias and back pain  Skin: Positive for color change (slight redness around wound)  Negative for rash  Neurological: Negative for seizures and syncope  All other systems reviewed and are negative  Physical Exam  Physical Exam  Vitals and nursing note reviewed  Constitutional:       General: She is not in acute distress  Appearance: Normal appearance  She is well-developed  HENT:      Head: Normocephalic and atraumatic  Mouth/Throat:      Mouth: Mucous membranes are moist    Eyes:      Extraocular Movements: Extraocular movements intact  Conjunctiva/sclera: Conjunctivae normal       Pupils: Pupils are equal, round, and reactive to light  Cardiovascular:      Rate and Rhythm: Normal rate and regular rhythm  Pulses: Normal pulses  Heart sounds: Normal heart sounds  No murmur heard  Pulmonary:      Effort: Pulmonary effort is normal  No respiratory distress  Breath sounds: Normal breath sounds  Abdominal:      General: Abdomen is flat  There is no distension  Palpations: Abdomen is soft  There is no mass  Tenderness: There is abdominal tenderness (around wound and EDDI drain)  There is no guarding or rebound  Musculoskeletal:      Cervical back: Normal range of motion and neck supple  Skin:     General: Skin is warm and dry  Capillary Refill: Capillary refill takes less than 2 seconds  Neurological:      General: No focal deficit present  Mental Status: She is alert and oriented to person, place, and time     Psychiatric:         Mood and Affect: Mood normal          Behavior: Behavior normal          Vital Signs  ED Triage Vitals   Temperature Pulse Respirations Blood Pressure SpO2   07/28/22 1328 07/28/22 1328 07/28/22 1328 07/28/22 1328 07/28/22 1328   98 5 °F (36 9 °C) 60 16 158/78 100 %      Temp Source Heart Rate Source Patient Position - Orthostatic VS BP Location FiO2 (%)   07/28/22 1328 07/28/22 1328 07/28/22 1328 07/28/22 1328 --   Oral Monitor Lying Right arm       Pain Score       07/28/22 1428       9           Vitals:    07/28/22 1328 07/28/22 1329 07/28/22 1526   BP: 158/78 158/78 151/69   Pulse: 60 70 61   Patient Position - Orthostatic VS: Lying  Lying         Visual Acuity      ED Medications  Medications   morphine injection 4 mg (4 mg Intravenous Given 7/28/22 1428)   iohexol (OMNIPAQUE) 350 MG/ML injection (SINGLE-DOSE) 70 mL (70 mL Intravenous Given 7/28/22 1445)       Diagnostic Studies  Results Reviewed     Procedure Component Value Units Date/Time    Basic metabolic panel [[id number]] Collected: 07/28/22 1407    Lab Status: Final result Specimen: Blood from Arm, Left Updated: 07/28/22 1427     Sodium 141 mmol/L      Potassium 4 2 mmol/L      Chloride 107 mmol/L      CO2 21 mmol/L      ANION GAP 13 mmol/L      BUN 12 mg/dL      Creatinine 1 22 mg/dL      Glucose 94 mg/dL      Calcium 9 8 mg/dL      eGFR 49 ml/min/1 73sq m     Narrative:      [place] guidelines for Chronic Kidney Disease (CKD):     Stage 1 with normal or high GFR (GFR > 90 mL/min/1 73 square meters)    Stage 2 Mild CKD (GFR = 60-89 mL/min/1 73 square meters)    Stage 3A Moderate CKD (GFR = 45-59 mL/min/1 73 square meters)    Stage 3B Moderate CKD (GFR = 30-44 mL/min/1 73 square meters)    Stage 4 Severe CKD (GFR = 15-29 mL/min/1 73 square meters)    Stage 5 End Stage CKD (GFR <15 mL/min/1 73 square meters)  Note: GFR calculation is accurate only with a steady state creatinine    CBC and differential [[id number]] Collected: 07/28/22 1407    Lab Status: Final result Specimen: Blood from Arm, Left Updated: 07/28/22 1412     WBC 7 38 Thousand/uL      RBC 3 99 Million/uL      Hemoglobin 11 5 g/dL      Hematocrit 35 1 %      MCV 88 fL      MCH 28 8 pg      MCHC 32 8 g/dL      RDW 13 6 %      MPV 11 2 fL      Platelets 871 Thousands/uL      nRBC 0 /100 WBCs      Neutrophils Relative 70 %      Immat GRANS % 0 %      Lymphocytes Relative 19 %      Monocytes Relative 7 %      Eosinophils Relative 4 %      Basophils Relative 0 %      Neutrophils Absolute 5 15 Thousands/µL      Immature Grans Absolute 0 02 Thousand/uL      Lymphocytes Absolute 1 40 Thousands/µL      Monocytes Absolute 0 51 Thousand/µL      Eosinophils Absolute 0 27 Thousand/µL      Basophils Absolute 0 03 Thousands/µL                  CT abdomen pelvis with contrast   Final Result by Jordan Duque MD (07/28 8178)      Evidence of recent midline abdominal wall hernia repair  Findings of inflammation/irritation not definitely abnormal for recent surgery  Subcutaneous abdominal fluid collection and abdominal wall fluid, postoperative versus infection  Fluid sampling    may be indicated  No acute intra-abdominal process  Workstation performed: [id number]                    Procedures  Procedures         ED Course                               SBIRT 22yo+    Flowsheet Row Most Recent Value   SBIRT (23 yo +)    In order to provide better care to our patients, we are screening all of our patients for alcohol and drug use  Would it be okay to ask you these screening questions? Yes Filed at: 07/28/2022 1408   Initial Alcohol Screen: US AUDIT-C     1  How often do you have a drink containing alcohol? 0 Filed at: 07/28/2022 1408   2  How many drinks containing alcohol do you have on a typical day you are drinking? 0 Filed at: 07/28/2022 1408   3b  FEMALE Any Age, or MALE 65+: How often do you have 4 or more drinks on one occassion? 0 Filed at: 07/28/2022 1408   Audit-C Score 0 Filed at: 07/28/2022 1408   PEGGY: How many times in the past year have you    Used an illegal drug or used a prescription medication for non-medical reasons? Never Filed at: 07/28/2022 1408                    MDM  Number of Diagnoses or Management Options  Diagnosis management comments: Called on call physician for Dr Charity Hernandez to discuss patient's presentation and management  Surgeon suggested we obtain a blood count and a CT scan with contrast to rule out abscess         Amount and/or Complexity of Data Reviewed  Clinical lab tests: ordered and reviewed  Tests in the radiology section of CPT®: ordered and reviewed  Discuss the patient with other providers: yes  Independent visualization of images, tracings, or specimens: yes    Risk of Complications, Morbidity, and/or Mortality  General comments: Discussed with surgeon - ok to d/c outpatient follow up  Patient Progress  Patient progress: stable      Disposition  Final diagnoses:   Post-operative pain   Abdominal wall seroma, initial encounter     Time reflects when diagnosis was documented in both MDM as applicable and the Disposition within this note     Time User Action Codes Description Comment    7/28/2022  4:48 PM James Lewis [G89 18] Post-operative pain     7/28/2022  4:48 PM James Deleon [S30 1XXA] Abdominal wall seroma, initial encounter     7/28/2022  4:48 PM James Deleon [G89 18] Post-operative pain     7/28/2022  4:48 PM James Lewis Modify [S30 1XXA] Abdominal wall seroma, initial encounter       ED Disposition     ED Disposition   Discharge    Condition   Stable    Date/Time   Thu Jul 28, 2022  4:48 PM    Comment   Lawrence Zhang discharge to home/self care  Follow-up Information     Follow up With Specialties Details Why Contact Info        follow up with your surgeon TUesday as discussed          Patient's Medications    No medications on file       No discharge procedures on file      PDMP Review     None          ED Provider  Electronically Signed by           Geoff Mathur MD  07/28/22 9315

## 2022-08-19 ENCOUNTER — APPOINTMENT (OUTPATIENT)
Dept: ORTHOPEDIC SURGERY | Facility: CLINIC | Age: 57
End: 2022-08-19

## 2022-09-01 ENCOUNTER — APPOINTMENT (OUTPATIENT)
Dept: ORTHOPEDIC SURGERY | Facility: CLINIC | Age: 57
End: 2022-09-01

## 2022-09-01 PROCEDURE — 73560 X-RAY EXAM OF KNEE 1 OR 2: CPT | Mod: LT

## 2022-09-01 RX ORDER — NIFEDIPINE 60 MG
60 TABLET, EXTENDED RELEASE ORAL
Refills: 0 | Status: COMPLETED | COMMUNITY
End: 2022-09-01

## 2022-09-01 RX ORDER — LISINOPRIL 40 MG/1
40 TABLET ORAL
Refills: 0 | Status: ACTIVE | COMMUNITY

## 2022-09-01 RX ORDER — METOPROLOL SUCCINATE 50 MG/1
50 TABLET, EXTENDED RELEASE ORAL
Refills: 0 | Status: ACTIVE | COMMUNITY

## 2022-09-01 RX ORDER — METOCLOPRAMIDE 10 MG/1
10 TABLET ORAL 4 TIMES DAILY
Qty: 120 | Refills: 0 | Status: COMPLETED | COMMUNITY
Start: 2017-07-06 | End: 2022-09-01

## 2022-09-01 RX ORDER — ATORVASTATIN CALCIUM 20 MG/1
20 TABLET, FILM COATED ORAL
Refills: 0 | Status: ACTIVE | COMMUNITY

## 2022-09-01 RX ORDER — HYDROCHLOROTHIAZIDE 25 MG/1
25 TABLET ORAL
Refills: 0 | Status: COMPLETED | COMMUNITY
End: 2022-09-01

## 2022-09-01 RX ORDER — LISINOPRIL 40 MG/1
40 TABLET ORAL
Refills: 0 | Status: COMPLETED | COMMUNITY
End: 2022-09-01

## 2022-09-01 RX ORDER — METOPROLOL SUCCINATE 50 MG/1
50 TABLET, EXTENDED RELEASE ORAL
Refills: 0 | Status: COMPLETED | COMMUNITY
End: 2022-09-01

## 2022-09-01 RX ORDER — OMEPRAZOLE 40 MG/1
40 CAPSULE, DELAYED RELEASE ORAL
Qty: 30 | Refills: 5 | Status: COMPLETED | COMMUNITY
Start: 2017-05-22 | End: 2022-09-01

## 2022-09-01 RX ORDER — HYDRALAZINE HYDROCHLORIDE 100 MG/1
100 TABLET ORAL
Refills: 0 | Status: ACTIVE | COMMUNITY

## 2022-09-01 RX ORDER — METFORMIN HYDROCHLORIDE 500 MG/1
500 TABLET, COATED ORAL
Refills: 0 | Status: COMPLETED | COMMUNITY
End: 2022-09-01

## 2022-09-01 RX ORDER — OXYCODONE AND ACETAMINOPHEN 5; 325 MG/1; MG/1
5-325 TABLET ORAL
Qty: 15 | Refills: 0 | Status: COMPLETED | COMMUNITY
Start: 2017-07-17 | End: 2022-09-01

## 2022-09-01 RX ORDER — ASPIRIN 325 MG/1
325 TABLET ORAL
Refills: 0 | Status: COMPLETED | COMMUNITY
End: 2022-09-01

## 2022-09-01 RX ORDER — NIFEDIPINE 90 MG
90 TABLET, EXTENDED RELEASE ORAL
Refills: 0 | Status: ACTIVE | COMMUNITY

## 2022-09-01 NOTE — ASSESSMENT
[FreeTextEntry1] : Radiographs reviewed and explained. \par Patient is a surgical candidate for a LT TKA \par \par Patient has done multiple courses of physical therapy, nsaids, CSI, Gel injections with no relief.\par \par Patient to find orthopedic who par with insurance\par \par \par f/u prn

## 2022-09-01 NOTE — IMAGING
[de-identified] : Constitutional: The patient appears well developed and well nourished\par \par Neurologic: Patients coordination is normal, they are alert to time and place. There is no evidence of mood disorder, patient is calm.\par \par   LEFT KNEE:\par Inspection of the knee is as follows: No effusion, erythema, ecchymosis, atrophy, scars or deformities.\par \par Palpation of the knee is as follows: Medial Joint Line is TTP, Lateral joint line is TTP, Patellofemoral TTP\par \par Knee Range of Motion is as follows:                   Flexion        120\par                                                                              Extension      0\par \par Knee Strength Is as Follows                           Quadriceps        5/5\par                                                                          Hamstrings        5/5\par \par Special testing is as follows: Patient is ligamentously stable, patella tracks well, has a good end point and no extensor lag\par \par Patient Light touch is intact throughout, there are no focal motor deficits\par \par Patient ambulates well, patient ambulates with Knee stabilizer brace and cane.\par \par \par

## 2022-09-01 NOTE — HISTORY OF PRESENT ILLNESS
[de-identified] : Patient presents in office today with LT knee pain. Last saw  10/21/21 but he does not take her insurance anymore. Inital injury happened 2016 when she fell and landed on her LT knee. Fell again Aug 2020 and landed on both knees in [employer]. States she has been falling a lot lately because her knee spasms and give out. PMHX of LT knee scope. States she is seeing pain management. Ambulates with can and needs to wear knee brace. \par \par Was told she would need surgery last year and would like to discuss that today.

## 2022-12-14 ENCOUNTER — HOSPITAL ENCOUNTER (EMERGENCY)
Facility: HOSPITAL | Age: 57
Discharge: HOME | End: 2022-12-14
Attending: EMERGENCY MEDICINE

## 2022-12-14 ENCOUNTER — APPOINTMENT (EMERGENCY)
Dept: RADIOLOGY | Facility: HOSPITAL | Age: 57
End: 2022-12-14

## 2022-12-14 VITALS
HEART RATE: 57 BPM | SYSTOLIC BLOOD PRESSURE: 139 MMHG | TEMPERATURE: 97.9 F | OXYGEN SATURATION: 100 % | DIASTOLIC BLOOD PRESSURE: 70 MMHG | RESPIRATION RATE: 20 BRPM

## 2022-12-14 DIAGNOSIS — U07.1 COVID-19: Primary | ICD-10-CM

## 2022-12-14 DIAGNOSIS — J45.901 ASTHMA EXACERBATION: ICD-10-CM

## 2022-12-14 DIAGNOSIS — R09.81 NASAL CONGESTION: ICD-10-CM

## 2022-12-14 RX ORDER — PREDNISONE 20 MG/1
40 TABLET ORAL ONCE
Status: COMPLETED | OUTPATIENT
Start: 2022-12-14 | End: 2022-12-14

## 2022-12-14 RX ORDER — GUAIFENESIN 600 MG/1
600 TABLET, EXTENDED RELEASE ORAL ONCE
Status: COMPLETED | OUTPATIENT
Start: 2022-12-14 | End: 2022-12-14

## 2022-12-14 RX ORDER — PREDNISONE 20 MG/1
40 TABLET ORAL DAILY
Qty: 10 TABLET | Refills: 0 | Status: SHIPPED | OUTPATIENT
Start: 2022-12-14 | End: 2022-12-19

## 2022-12-14 RX ORDER — IPRATROPIUM BROMIDE AND ALBUTEROL SULFATE 2.5; .5 MG/3ML; MG/3ML
3 SOLUTION RESPIRATORY (INHALATION) ONCE
Status: COMPLETED | OUTPATIENT
Start: 2022-12-14 | End: 2022-12-14

## 2022-12-14 RX ORDER — GUAIFENESIN 600 MG/1
1200 TABLET, EXTENDED RELEASE ORAL EVERY 12 HOURS SCHEDULED
Qty: 40 TABLET | Refills: 0 | Status: SHIPPED | OUTPATIENT
Start: 2022-12-14

## 2022-12-14 RX ADMIN — GUAIFENESIN 600 MG: 600 TABLET ORAL at 08:46

## 2022-12-14 RX ADMIN — IPRATROPIUM BROMIDE AND ALBUTEROL SULFATE 3 ML: 2.5; .5 SOLUTION RESPIRATORY (INHALATION) at 08:48

## 2022-12-14 RX ADMIN — PREDNISONE 40 MG: 20 TABLET ORAL at 08:46

## 2022-12-14 NOTE — DISCHARGE INSTRUCTIONS
Decongestant use, warm fluids with honey, tylenol/ibuprofen as needed for fever or body aches  Continue prednisone as directed, use albuterl inhaler as needed for relief of chest tightness and wheezing  PCP follow up for reassessment if symptoms continue  Return immediately with any new or worsening symptoms including but not limited to onset of chest pain, shortness of breath, or any other new or worrisome symptoms

## 2022-12-14 NOTE — ED PROVIDER NOTES
History  Chief Complaint   Patient presents with   • Nasal Congestion     Pt states she was diagnosed with covid on Monday and continues to have congestion      Disha Wei is a 56-year-old female with past medical history including asthma arriving to the emergency department today for evaluation with chief complaint of chest tightness and congestion with wheeze, in setting of recent diagnosis of COVID-19  Patient states that she had started with nasal congestion, postnasal drainage, cough productive for yellow phlegm a few days ago and was diagnosed with COVID-19 on Monday  She states that she was initially having fever with onset of symptoms as well, which has since resolved  She denies any chest pain or pain with deep inhalation, as well as any shortness of breath or exertional dyspnea  The patient states that she was previously seen in urgent care and given an albuterol inhaler which does improve her symptoms when used  Denies prior hospitalization, ICU stay, or intubation for asthma exacerbations  She offers no other complaints or concerns at this time  None       Past Medical History:   Diagnosis Date   • Hernia of abdominal cavity        History reviewed  No pertinent surgical history  History reviewed  No pertinent family history  I have reviewed and agree with the history as documented  E-Cigarette/Vaping     E-Cigarette/Vaping Substances     Social History     Tobacco Use   • Smoking status: Never   • Smokeless tobacco: Never   Substance Use Topics   • Alcohol use: Not Currently   • Drug use: Never       Review of Systems   Constitutional: Negative for chills, diaphoresis, fatigue and fever  Respiratory: Positive for cough and wheezing  Negative for shortness of breath  Cardiovascular: Negative for chest pain, palpitations and leg swelling  Gastrointestinal: Negative for nausea and vomiting  Musculoskeletal: Negative for myalgias and neck pain     Neurological: Negative for headaches  All other systems reviewed and are negative  Physical Exam  Physical Exam  Vitals and nursing note reviewed  Constitutional:       General: She is not in acute distress  Appearance: Normal appearance  She is not ill-appearing, toxic-appearing or diaphoretic  HENT:      Head: Normocephalic and atraumatic  Right Ear: External ear normal       Left Ear: External ear normal       Nose: Congestion present  Eyes:      Conjunctiva/sclera: Conjunctivae normal    Cardiovascular:      Rate and Rhythm: Normal rate  Pulmonary:      Effort: Pulmonary effort is normal  No respiratory distress  Breath sounds: Wheezing present  No rhonchi or rales  Comments: Patient speaking in full sentences without conversational dyspnea  There is scattered expiratory wheeze on auscultation of the posterior lung fields  Oxygen saturation 100% on room air  Musculoskeletal:         General: Normal range of motion  Cervical back: Normal range of motion and neck supple  Skin:     General: Skin is warm and dry  Capillary Refill: Capillary refill takes less than 2 seconds  Neurological:      Mental Status: She is alert  Motor: Motor function is intact     Psychiatric:         Mood and Affect: Mood normal          Vital Signs  ED Triage Vitals [12/14/22 0820]   Temperature Pulse Respirations Blood Pressure SpO2   97 9 °F (36 6 °C) 57 20 139/70 100 %      Temp src Heart Rate Source Patient Position - Orthostatic VS BP Location FiO2 (%)   -- -- -- Left arm --      Pain Score       --           Vitals:    12/14/22 0820   BP: 139/70   Pulse: 57         Visual Acuity      ED Medications  Medications   guaiFENesin (MUCINEX) 12 hr tablet 600 mg (600 mg Oral Given 12/14/22 0846)   predniSONE tablet 40 mg (40 mg Oral Given 12/14/22 0846)   ipratropium-albuterol (DUO-NEB) 0 5-2 5 mg/3 mL inhalation solution 3 mL (3 mL Nebulization Given 12/14/22 0848)       Diagnostic Studies  Results Reviewed None                 XR chest pa & lateral   Final Result by Shanda Woody MD (12/14 4319)      Mild enlargement of cardiac silhouette  Clear lungs  Workstation performed: [id number]                    Procedures  Procedures         ED Course                                             MDM  Number of Diagnoses or Management Options  Asthma exacerbation: new and requires workup  COVID-19: new and requires workup  Nasal congestion  Diagnosis management comments: This is a 61-year-old female presenting with complaint of chest congestion in setting of recent diagnosis of COVID-19 2 days ago  Patient reports cough productive for clear/yellow phlegm, chest tightness and wheezing  She has no active chest pain, also denying any shortness of breath or other respiratory complaints  Differential diagnosis includes but is not limited to: COVID-19, viral URI, bronchitis, pneumonia, asthma exacerbation, allergies    Initial ED plan: Chest x-ray, breathing treatment/prednisone and reassessment    Final ED Assessment: Vital signs reviewed on ED presentation, examination as above which is without evidence of respiratory distress  Chest x-ray obtained which is negative for infiltrate on my preliminary review  The patient had received prednisone and a DuoNeb in the department, with resolution of wheezing on repeat auscultation  Recommended continued use of albuterol inhaler as directed for alleviation of chest tightness and wheezing, and will also discharge with prescription for prednisone burst in addition to Mucinex to take as needed for relief of congestion  Recommended warm fluids with honey, and humidified air  Recommended PCP follow-up for continued care if symptoms persist, and close parameters for ED return discussed at length  The patient had verbalized understanding and was comfortable and agreeable with disposition and care plan    She was discharged in stable condition and ambulated independently from the emergency department today without issue  Amount and/or Complexity of Data Reviewed  Tests in the radiology section of CPT®: ordered and reviewed  Review and summarize past medical records: yes  Independent visualization of images, tracings, or specimens: yes    Risk of Complications, Morbidity, and/or Mortality  Presenting problems: low  Diagnostic procedures: low  Management options: low    Patient Progress  Patient progress: stable      Disposition  Final diagnoses:   Nasal congestion   COVID-19     Time reflects when diagnosis was documented in both MDM as applicable and the Disposition within this note     Time User Action Codes Description Comment    12/14/2022  8:23 AM Trish Mitchell Add [R09 81] Nasal congestion     12/14/2022  8:23 AM Trish Mitchell Add [U07 1] COVID-19       ED Disposition     None      Follow-up Information     Follow up With Specialties Details Why Contact Info Additional Information    [facility]   As needed [id number] [facility] Department Emergency Medicine  If symptoms worsen [address] Emergency Department, [address] [place] [address], [id number]          Discharge Medication List as of 12/14/2022  8:58 AM      START taking these medications    Details   guaiFENesin (MUCINEX) 600 mg 12 hr tablet Take 2 tablets (1,200 mg total) by mouth every 12 (twelve) hours, Starting Wed 12/14/2022, Normal      predniSONE 20 mg tablet Take 2 tablets (40 mg total) by mouth daily for 5 days, Starting Wed 12/14/2022, Until Mon 12/19/2022, Normal             No discharge procedures on file      PDMP Review     None          ED Provider  Electronically Signed by           Jennifer Saunders PA-C  12/14/22 0070

## 2023-02-21 ENCOUNTER — APPOINTMENT (EMERGENCY)
Dept: RADIOLOGY | Facility: HOSPITAL | Age: 58
End: 2023-02-21

## 2023-02-21 ENCOUNTER — APPOINTMENT (EMERGENCY)
Dept: CT IMAGING | Facility: HOSPITAL | Age: 58
End: 2023-02-21

## 2023-02-21 ENCOUNTER — HOSPITAL ENCOUNTER (EMERGENCY)
Facility: HOSPITAL | Age: 58
Discharge: HOME | End: 2023-02-21
Attending: EMERGENCY MEDICINE

## 2023-02-21 VITALS
OXYGEN SATURATION: 100 % | TEMPERATURE: 98.2 F | DIASTOLIC BLOOD PRESSURE: 67 MMHG | SYSTOLIC BLOOD PRESSURE: 155 MMHG | RESPIRATION RATE: 19 BRPM | HEART RATE: 47 BPM

## 2023-02-21 DIAGNOSIS — R10.84 GENERALIZED ABDOMINAL PAIN: Primary | ICD-10-CM

## 2023-02-21 DIAGNOSIS — M54.50 THORACOLUMBAR BACK PAIN: ICD-10-CM

## 2023-02-21 DIAGNOSIS — R19.8 ABDOMINAL TIGHTNESS: ICD-10-CM

## 2023-02-21 DIAGNOSIS — M54.6 THORACOLUMBAR BACK PAIN: ICD-10-CM

## 2023-02-21 LAB
2HR DELTA HS TROPONIN: -2 NG/L
ALBUMIN SERPL BCP-MCNC: 3.9 G/DL (ref 3.5–5)
ALP SERPL-CCNC: 66 U/L (ref 34–104)
ALT SERPL W P-5'-P-CCNC: 11 U/L (ref 7–52)
ANION GAP SERPL CALCULATED.3IONS-SCNC: 5 MMOL/L (ref 4–13)
AST SERPL W P-5'-P-CCNC: 12 U/L (ref 13–39)
ATRIAL RATE: 61 BPM
BACTERIA UR QL AUTO: NORMAL /HPF
BASOPHILS # BLD AUTO: 0.04 THOUSANDS/ÂΜL (ref 0–0.1)
BASOPHILS NFR BLD AUTO: 1 % (ref 0–1)
BILIRUB DIRECT SERPL-MCNC: 0.14 MG/DL (ref 0–0.2)
BILIRUB SERPL-MCNC: 0.68 MG/DL (ref 0.2–1)
BILIRUB UR QL STRIP: NEGATIVE
BUN SERPL-MCNC: 19 MG/DL (ref 5–25)
CALCIUM SERPL-MCNC: 9.6 MG/DL (ref 8.4–10.2)
CARDIAC TROPONIN I PNL SERPL HS: 5 NG/L
CARDIAC TROPONIN I PNL SERPL HS: 7 NG/L
CHLORIDE SERPL-SCNC: 113 MMOL/L (ref 96–108)
CLARITY UR: CLEAR
CO2 SERPL-SCNC: 24 MMOL/L (ref 21–32)
COLOR UR: ABNORMAL
CREAT SERPL-MCNC: 1.34 MG/DL (ref 0.6–1.3)
EOSINOPHIL # BLD AUTO: 0.18 THOUSAND/ÂΜL (ref 0–0.61)
EOSINOPHIL NFR BLD AUTO: 4 % (ref 0–6)
ERYTHROCYTE [DISTWIDTH] IN BLOOD BY AUTOMATED COUNT: 14.9 % (ref 11.6–15.1)
GFR SERPL CREATININE-BSD FRML MDRD: 44 ML/MIN/1.73SQ M
GLUCOSE SERPL-MCNC: 75 MG/DL (ref 65–140)
GLUCOSE UR STRIP-MCNC: NEGATIVE MG/DL
HCT VFR BLD AUTO: 35.5 % (ref 34.8–46.1)
HGB BLD-MCNC: 11.8 G/DL (ref 11.5–15.4)
HGB UR QL STRIP.AUTO: NEGATIVE
IMM GRANULOCYTES # BLD AUTO: 0.01 THOUSAND/UL (ref 0–0.2)
IMM GRANULOCYTES NFR BLD AUTO: 0 % (ref 0–2)
KETONES UR STRIP-MCNC: NEGATIVE MG/DL
LEUKOCYTE ESTERASE UR QL STRIP: NEGATIVE
LIPASE SERPL-CCNC: 45 U/L (ref 11–82)
LYMPHOCYTES # BLD AUTO: 1.91 THOUSANDS/ÂΜL (ref 0.6–4.47)
LYMPHOCYTES NFR BLD AUTO: 42 % (ref 14–44)
MAGNESIUM SERPL-MCNC: 1.9 MG/DL (ref 1.9–2.7)
MCH RBC QN AUTO: 28.6 PG (ref 26.8–34.3)
MCHC RBC AUTO-ENTMCNC: 33.2 G/DL (ref 31.4–37.4)
MCV RBC AUTO: 86 FL (ref 82–98)
MONOCYTES # BLD AUTO: 0.41 THOUSAND/ÂΜL (ref 0.17–1.22)
MONOCYTES NFR BLD AUTO: 9 % (ref 4–12)
NEUTROPHILS # BLD AUTO: 2.02 THOUSANDS/ÂΜL (ref 1.85–7.62)
NEUTS SEG NFR BLD AUTO: 44 % (ref 43–75)
NITRITE UR QL STRIP: NEGATIVE
NON-SQ EPI CELLS URNS QL MICRO: NORMAL /HPF
NRBC BLD AUTO-RTO: 0 /100 WBCS
PH UR STRIP.AUTO: 6 [PH]
PLATELET # BLD AUTO: 207 THOUSANDS/UL (ref 149–390)
PMV BLD AUTO: 11.8 FL (ref 8.9–12.7)
POTASSIUM SERPL-SCNC: 3.8 MMOL/L (ref 3.5–5.3)
PR INTERVAL: 232 MS
PROT SERPL-MCNC: 6.5 G/DL (ref 6.4–8.4)
PROT UR STRIP-MCNC: ABNORMAL MG/DL
QRS AXIS: 90 DEGREES
QRSD INTERVAL: 84 MS
QT INTERVAL: 434 MS
QTC INTERVAL: 436 MS
RBC # BLD AUTO: 4.12 MILLION/UL (ref 3.81–5.12)
RBC #/AREA URNS AUTO: NORMAL /HPF
SODIUM SERPL-SCNC: 142 MMOL/L (ref 135–147)
SP GR UR STRIP.AUTO: <=1.005 (ref 1–1.03)
T WAVE AXIS: 86 DEGREES
UROBILINOGEN UR QL STRIP.AUTO: 0.2 E.U./DL
VENTRICULAR RATE: 61 BPM
WBC # BLD AUTO: 4.57 THOUSAND/UL (ref 4.31–10.16)
WBC #/AREA URNS AUTO: NORMAL /HPF

## 2023-02-21 RX ORDER — DICYCLOMINE HCL 20 MG
20 TABLET ORAL ONCE
Status: COMPLETED | OUTPATIENT
Start: 2023-02-21 | End: 2023-02-21

## 2023-02-21 RX ORDER — LIDOCAINE 50 MG/G
1 PATCH TOPICAL ONCE
Status: DISCONTINUED | OUTPATIENT
Start: 2023-02-21 | End: 2023-02-21 | Stop reason: HOSPADM

## 2023-02-21 RX ORDER — DICYCLOMINE HCL 20 MG
20 TABLET ORAL EVERY 8 HOURS PRN
Qty: 12 TABLET | Refills: 0 | Status: SHIPPED | OUTPATIENT
Start: 2023-02-21 | End: 2023-03-02 | Stop reason: SDUPTHER

## 2023-02-21 RX ORDER — MAGNESIUM HYDROXIDE/ALUMINUM HYDROXICE/SIMETHICONE 120; 1200; 1200 MG/30ML; MG/30ML; MG/30ML
30 SUSPENSION ORAL ONCE
Status: COMPLETED | OUTPATIENT
Start: 2023-02-21 | End: 2023-02-21

## 2023-02-21 RX ORDER — MORPHINE SULFATE 4 MG/ML
4 INJECTION, SOLUTION INTRAMUSCULAR; INTRAVENOUS ONCE
Status: COMPLETED | OUTPATIENT
Start: 2023-02-21 | End: 2023-02-21

## 2023-02-21 RX ORDER — MORPHINE SULFATE 15 MG/1
TABLET ORAL
Qty: 6 TABLET | Refills: 0 | Status: SHIPPED | OUTPATIENT
Start: 2023-02-21 | End: 2023-03-02

## 2023-02-21 RX ADMIN — DICYCLOMINE HYDROCHLORIDE 20 MG: 20 TABLET ORAL at 20:35

## 2023-02-21 RX ADMIN — LIDOCAINE 5% 1 PATCH: 700 PATCH TOPICAL at 18:01

## 2023-02-21 RX ADMIN — SODIUM CHLORIDE 500 ML: 0.9 INJECTION, SOLUTION INTRAVENOUS at 18:02

## 2023-02-21 RX ADMIN — MORPHINE SULFATE 4 MG: 4 INJECTION INTRAVENOUS at 18:01

## 2023-02-21 RX ADMIN — IOHEXOL 100 ML: 350 INJECTION, SOLUTION INTRAVENOUS at 19:08

## 2023-02-21 RX ADMIN — ALUMINUM HYDROXIDE, MAGNESIUM HYDROXIDE, AND DIMETHICONE 30 ML: 200; 20; 200 SUSPENSION ORAL at 20:56

## 2023-02-22 ENCOUNTER — TELEPHONE (OUTPATIENT)
Dept: GASTROENTEROLOGY | Facility: CLINIC | Age: 58
End: 2023-02-22

## 2023-02-22 NOTE — TELEPHONE ENCOUNTER
Left message to call the office for an appointment, received referral that patient needed to be seen asap

## 2023-02-22 NOTE — ED PROVIDER NOTES
History  Chief Complaint   Patient presents with   • Abdominal Pain     Pt reports 3 days of R sided back pain that radiates into abdomen  Reports nausea, denies nausea or vomiting  Denies trouble urinating     Patient is a 68-year-old female with past medical history of hypertension, hyperlipidemia, depression, chronic kidney disease, prior abdominal hernia repair in July 2022, presents to the emergency department complaining of 3 days of diffuse back pain radiating into her abdomen  She states she has had associated nausea and feels constipated however is still having a bowel movement and last BM was yesterday  She reports the pain to be tightness in her abdomen, crampy and diffuse  She feels the pain in her thoracolumbar region bilaterally as well as midline  She states occasionally the abdominal pain will radiate into her chest   She denies any associated fevers or chills, headache, dizziness or near syncope, recent cough, URI symptoms, hemoptysis, dyspnea, palpitations, abdominal distention, vomiting, diarrhea, blood per rectum or melena, dysuria, change in frequency, hematuria, skin rash or color change, extremity weakness or paresthesia or other focal neurologic deficits  Patient states she is supposed to be worked up for gallbladder disease and is pending gallbladder ultrasound and blood work  Patient also states that she recently had a lipoma removed from her left upper back  History provided by:  Patient   used: No    Abdominal Pain  Associated symptoms: chest pain    Associated symptoms: no chills, no constipation, no cough, no diarrhea, no dysuria, no fever, no hematuria, no nausea, no shortness of breath, no sore throat and no vomiting        Prior to Admission Medications   Prescriptions Last Dose Informant Patient Reported? Taking?    guaiFENesin (MUCINEX) 600 mg 12 hr tablet   No No   Sig: Take 2 tablets (1,200 mg total) by mouth every 12 (twelve) hours Facility-Administered Medications: None       Past Medical History:   Diagnosis Date   • Hernia of abdominal cavity        History reviewed  No pertinent surgical history  History reviewed  No pertinent family history  I have reviewed and agree with the history as documented  E-Cigarette/Vaping     E-Cigarette/Vaping Substances     Social History     Tobacco Use   • Smoking status: Never   • Smokeless tobacco: Never   Substance Use Topics   • Alcohol use: Not Currently   • Drug use: Never       Review of Systems   Constitutional: Negative for chills and fever  HENT: Negative for congestion, ear pain, rhinorrhea and sore throat  Respiratory: Negative for cough, chest tightness, shortness of breath and wheezing  Cardiovascular: Positive for chest pain  Negative for palpitations and leg swelling  Gastrointestinal: Positive for abdominal pain  Negative for abdominal distention, blood in stool, constipation, diarrhea, nausea and vomiting  Genitourinary: Negative for dysuria, flank pain, frequency and hematuria  Musculoskeletal: Positive for back pain  Negative for neck pain and neck stiffness  Skin: Negative for color change, pallor, rash and wound  Allergic/Immunologic: Negative for immunocompromised state  Neurological: Negative for dizziness, syncope, weakness, light-headedness, numbness and headaches  Hematological: Negative for adenopathy  Psychiatric/Behavioral: Negative for confusion and decreased concentration  All other systems reviewed and are negative  Physical Exam  Physical Exam  Vitals and nursing note reviewed  Constitutional:       General: She is not in acute distress  Appearance: Normal appearance  She is well-developed  She is not ill-appearing, toxic-appearing or diaphoretic  HENT:      Head: Normocephalic and atraumatic        Right Ear: External ear normal       Left Ear: External ear normal       Mouth/Throat:      Comments: Orpharyngeal exam deferred at this time due to risk of exposure to respiratory illness/viruses during peak season  Patient has no oropharyngeal complaints  Eyes:      Extraocular Movements: Extraocular movements intact  Conjunctiva/sclera: Conjunctivae normal    Neck:      Vascular: No JVD  Cardiovascular:      Rate and Rhythm: Normal rate and regular rhythm  Pulses: Normal pulses  Heart sounds: Normal heart sounds  No murmur heard  No friction rub  No gallop  Pulmonary:      Effort: Pulmonary effort is normal  No respiratory distress  Breath sounds: Normal breath sounds  No wheezing, rhonchi or rales  Abdominal:      General: There is no distension  Palpations: Abdomen is soft  Tenderness: There is abdominal tenderness  There is no guarding or rebound  Comments: +Diffuse abdominal tenderness  Musculoskeletal:         General: No swelling or tenderness  Normal range of motion  Cervical back: Normal range of motion and neck supple  No rigidity  Comments: +Diffuse midline and bilateral paravertebral thoracolumbar tenderness  Skin:     General: Skin is warm and dry  Coloration: Skin is not pale  Findings: No erythema or rash  Neurological:      General: No focal deficit present  Mental Status: She is alert and oriented to person, place, and time  Sensory: No sensory deficit  Motor: No weakness     Psychiatric:         Mood and Affect: Mood normal          Behavior: Behavior normal          Vital Signs  ED Triage Vitals [02/21/23 1627]   Temperature Pulse Respirations Blood Pressure SpO2   98 2 °F (36 8 °C) 75 18 (!) 194/88 98 %      Temp src Heart Rate Source Patient Position - Orthostatic VS BP Location FiO2 (%)   -- -- Sitting Left arm --      Pain Score       --         Vitals:    02/21/23 1745 02/21/23 1915 02/21/23 2030 02/21/23 2102   BP: 167/88 (!) 172/70 (!) 182/88 155/67   BP Location:       Pulse: (!) 47 (!) 52 (!) 51 (!) 47   Resp: 18 18 17 19   Temp: SpO2: 100% 100% 99% 100%       Visual Acuity      ED Medications  Medications   lidocaine (LIDODERM) 5 % patch 1 patch (1 patch Topical Medication Applied 2/21/23 1801)   sodium chloride 0 9 % bolus 500 mL (0 mL Intravenous Stopped 2/21/23 2018)   morphine injection 4 mg (4 mg Intravenous Given 2/21/23 1801)   iohexol (OMNIPAQUE) 350 MG/ML injection (SINGLE-DOSE) 100 mL (100 mL Intravenous Given 2/21/23 1908)   dicyclomine (BENTYL) tablet 20 mg (20 mg Oral Given 2/21/23 2035)   aluminum-magnesium hydroxide-simethicone (MYLANTA) oral suspension 30 mL (30 mL Oral Given 2/21/23 2056)       Diagnostic Studies  Results Reviewed     Procedure Component Value Units Date/Time    HS Troponin I 2hr [[id number]]  (Normal) Collected: 02/21/23 2017    Lab Status: Final result Specimen: Blood from Arm, Left Updated: 02/21/23 2111     hs TnI 2hr 5 ng/L      Delta 2hr hsTnI -2 ng/L     Urine Microscopic [[id number]]  (Normal) Collected: 02/21/23 2003    Lab Status: Final result Specimen: Urine, Clean Catch Updated: 02/21/23 2025     RBC, UA None Seen /hpf      WBC, UA 1-2 /hpf      Epithelial Cells Occasional /hpf      Bacteria, UA None Seen /hpf     UA (URINE) with reflex to Scope [[id number]]  (Abnormal) Collected: 02/21/23 2003    Lab Status: Final result Specimen: Urine, Clean Catch Updated: 02/21/23 2016     Color, UA Light Yellow     Clarity, UA Clear     Specific Gravity, UA <=1 005     pH, UA 6 0     Leukocytes, UA Negative     Nitrite, UA Negative     Protein, UA 30 (1+) mg/dl      Glucose, UA Negative mg/dl      Ketones, UA Negative mg/dl      Bilirubin, UA Negative     Occult Blood, UA Negative     Urobilinogen, UA 0 2 E U /dl     HS Troponin 0hr (reflex protocol) [[id number]]  (Normal) Collected: 02/21/23 1801    Lab Status: Final result Specimen: Blood from Arm, Left Updated: 02/21/23 1843     hs TnI 0hr 7 ng/L     Basic metabolic panel [[id number]]  (Abnormal) Collected: 02/21/23 1801    Lab Status: Final result Specimen: Blood from Arm, Left Updated: 02/21/23 1833     Sodium 142 mmol/L      Potassium 3 8 mmol/L      Chloride 113 mmol/L      CO2 24 mmol/L      ANION GAP 5 mmol/L      BUN 19 mg/dL      Creatinine 1 34 mg/dL      Glucose 75 mg/dL      Calcium 9 6 mg/dL      eGFR 44 ml/min/1 73sq m     Narrative:      [place] guidelines for Chronic Kidney Disease (CKD):   •  Stage 1 with normal or high GFR (GFR > 90 mL/min/1 73 square meters)  •  Stage 2 Mild CKD (GFR = 60-89 mL/min/1 73 square meters)  •  Stage 3A Moderate CKD (GFR = 45-59 mL/min/1 73 square meters)  •  Stage 3B Moderate CKD (GFR = 30-44 mL/min/1 73 square meters)  •  Stage 4 Severe CKD (GFR = 15-29 mL/min/1 73 square meters)  •  Stage 5 End Stage CKD (GFR <15 mL/min/1 73 square meters)  Note: GFR calculation is accurate only with a steady state creatinine    Hepatic function panel [[id number]]  (Abnormal) Collected: 02/21/23 1801    Lab Status: Final result Specimen: Blood from Arm, Left Updated: 02/21/23 1833     Total Bilirubin 0 68 mg/dL      Bilirubin, Direct 0 14 mg/dL      Alkaline Phosphatase 66 U/L      AST 12 U/L      ALT 11 U/L      Total Protein 6 5 g/dL      Albumin 3 9 g/dL     Lipase [[id number]]  (Normal) Collected: 02/21/23 1801    Lab Status: Final result Specimen: Blood from Arm, Left Updated: 02/21/23 1833     Lipase 45 u/L     Magnesium [[id number]]  (Normal) Collected: 02/21/23 1801    Lab Status: Final result Specimen: Blood from Arm, Left Updated: 02/21/23 1833     Magnesium 1 9 mg/dL     CBC and differential [[id number]] Collected: 02/21/23 1801    Lab Status: Final result Specimen: Blood from Arm, Left Updated: 02/21/23 1810     WBC 4 57 Thousand/uL      RBC 4 12 Million/uL      Hemoglobin 11 8 g/dL      Hematocrit 35 5 %      MCV 86 fL      MCH 28 6 pg      MCHC 33 2 g/dL      RDW 14 9 %      MPV 11 8 fL      Platelets 045 Thousands/uL      nRBC 0 /100 WBCs      Neutrophils Relative 44 %      Immat GRANS % 0 % Lymphocytes Relative 42 %      Monocytes Relative 9 %      Eosinophils Relative 4 %      Basophils Relative 1 %      Neutrophils Absolute 2 02 Thousands/µL      Immature Grans Absolute 0 01 Thousand/uL      Lymphocytes Absolute 1 91 Thousands/µL      Monocytes Absolute 0 41 Thousand/µL      Eosinophils Absolute 0 18 Thousand/µL      Basophils Absolute 0 04 Thousands/µL                  CTA dissection protocol chest abdomen pelvis w wo contrast   Final Result by Clayton Durán MD (02/21 2024)      Left posterior thoracic subcutaneous fat stranding medial to the left scapula  Recommend direct visualization  No aortic aneurysm or dissection  Workstation performed: [id number]         XR chest 1 view portable   ED Interpretation by Angella Pastor DO (02/21 1807)   No acute abnormality in the chest                  Procedures  ECG 12 Lead Documentation Only    Date/Time: 2/21/2023 4:30 PM  Performed by: Angella Pastor DO  Authorized by: Angella Pastor DO     ECG reviewed by me, the ED Provider: yes    Patient location:  ED  Previous ECG:     Previous ECG:  Unavailable  Rate:     ECG rate:  61    ECG rate assessment: normal    Rhythm:     Rhythm: sinus rhythm and A-V block      Rhythm comment:  1st degree AV block  Ectopy:     Ectopy: none    QRS:     QRS axis:  Indeterminate    QRS intervals:  Normal  Conduction:     Conduction: abnormal      Abnormal conduction: 1st degree    ST segments:     ST segments:  Normal  T waves:     T waves: normal    Comments:      Low voltage QRS complex  ED Course  ED Course as of 02/21/23 2117   Tue Feb 21, 2023   1853 hs TnI 0hr: 7   1853 Creatinine(!): 1 34   1853 eGFR: 44  Mild ITZEL compared to 6 months ago in which GFR was 49  Patient getting 500cc IVF  2028 Bacteria, UA: None Seen   2028 Patient reassessed and pain improved but still feeling some tightness in the abdomen  We will add on Bentyl and reassess     2054 Updated patient about CT findings  Patient had lipoma resection 3 weeks ago in the left scapular region  I reassessed the incision and there is no significant tenderness, fluctuance, surrounding erythema or warmth  Most likely the surrounding fat stranding is postsurgical opposed to infectious  Patient still feels tightness within her abdomen  We will try Maalox that she already got the Bentyl  Next step would be to follow-up with a gastroenterologist and will place referral for one  Discussed that we ruled out acute surgical pathology with the CT scan as well as life-threatening pathology such as aortic dissection or acute MI  Agreed to write prescription for short course of oral morphine for severe pain as well as Bentyl and have her follow-up with both PCP and GI    2113 hs TnI 2hr: 5   2113 Delta 2hr hsTnI: -2  Repeat troponin improved  Patient stable for discharge at this time  Discussed who to follow up with, symptom management and when to return to the ED  HEART Risk Score    Flowsheet Row Most Recent Value   Heart Score Risk Calculator    History 0 Filed at: 02/21/2023 2017   ECG 0 Filed at: 02/21/2023 2017   Age 1 Filed at: 02/21/2023 2017   Risk Factors 2 Filed at: 02/21/2023 2017   Troponin 0 Filed at: 02/21/2023 2017   HEART Score 3 Filed at: 02/21/2023 2017                        SBIRT 22yo+    Flowsheet Row Most Recent Value   SBIRT (25 yo +)    In order to provide better care to our patients, we are screening all of our patients for alcohol and drug use  Would it be okay to ask you these screening questions? Yes Filed at: 02/21/2023 1756   Initial Alcohol Screen: US AUDIT-C     1  How often do you have a drink containing alcohol? 0 Filed at: 02/21/2023 1756   2  How many drinks containing alcohol do you have on a typical day you are drinking? 0 Filed at: 02/21/2023 1756   3a  Male UNDER 65: How often do you have five or more drinks on one occasion?  0 Filed at: 02/21/2023 1756 3b  FEMALE Any Age, or MALE 65+: How often do you have 4 or more drinks on one occassion? 0 Filed at: 02/21/2023 1756   Audit-C Score 0 Filed at: 02/21/2023 1756   PEGGY: How many times in the past year have you    Used an illegal drug or used a prescription medication for non-medical reasons? Never Filed at: 02/21/2023 1756                    Medical Decision Making  57-year-old female presents to the ED for several days of diffuse thoracolumbar back pain radiating into her abdomen  Patient has mild diffuse abdominal tenderness  Differential is vast and includes biliary colic or cholecystitis, nonspecific acute gastritis, enteritis, colitis, constipation/gas pains, musculoskeletal pain, renal colic secondary to passing stone, UTI or pyelonephritis, pancreatitis, diverticulitis, ACS, aortic dissection  Will work-up with abdominal and cardiac labs, CTA chest, abdomen and pelvis  Will give IVF 500cc and morphine for pain control  Lidocaine patch for back  Amount and/or Complexity of Data Reviewed  Labs: ordered  Decision-making details documented in ED Course  Radiology: ordered and independent interpretation performed  Decision-making details documented in ED Course  ECG/medicine tests: ordered and independent interpretation performed  Decision-making details documented in ED Course  Risk  Prescription drug management            Disposition  Final diagnoses:   Generalized abdominal pain   Abdominal tightness   Thoracolumbar back pain     Time reflects when diagnosis was documented in both MDM as applicable and the Disposition within this note     Time User Action Codes Description Comment    2/21/2023  9:14 PM Howard BRYSON Add [R10 84] Generalized abdominal pain     2/21/2023  9:14 PM Howard BRYSON Add [R19 8] Abdominal tightness     2/21/2023  9:14 PM Andra Hay Add [M54 50,  M54 6] Thoracolumbar back pain       ED Disposition     ED Disposition   Discharge    Condition   Stable Date/Time   Tue Feb 21, 2023  9:14 PM    Comment   Chuyita Taryn discharge to home/self care  Follow-up Information     Follow up With Specialties Details Why Contact Info Additional Information    Mickyyash Lopezes  Schedule an appointment as soon as possible for a visit   [place]  [phone]       [facility]  Call  To establish care with a primary care doctor [phone]       [facility] Gastroenterology Schedule an appointment as soon as possible for a visit   [address]  [address] [address]  Ashutosh Logan [address] [facility], [address] [facility] [address], [facility], [place], [address] [facility] Emergency Department Emergency Medicine Go to  If symptoms worsen [address] [facility] [address] [facility] Emergency Department, [address] [place], [place], [address]          Patient's Medications   Discharge Prescriptions    DICYCLOMINE (BENTYL) 20 MG TABLET    Take 1 tablet (20 mg total) by mouth every 8 (eight) hours as needed (abdominal pain)       Start Date: 2/21/2023 End Date: --       Order Dose: 20 mg       Quantity: 12 tablet    Refills: 0    MORPHINE (MSIR) 15 MG TABLET    Take half a tablet to one tablet by mouth every 6 hours as needed for severe pain         Start Date: 2/21/2023 End Date: --       Order Dose: --       Quantity: 6 tablet    Refills: 0           PDMP Review     None          ED Provider  Electronically Signed by           Gillian Curry DO  02/21/23 1127

## 2023-03-02 ENCOUNTER — CONSULT (OUTPATIENT)
Dept: GASTROENTEROLOGY | Facility: CLINIC | Age: 58
End: 2023-03-02

## 2023-03-02 VITALS — HEIGHT: 61 IN | BODY MASS INDEX: 41.24 KG/M2

## 2023-03-02 DIAGNOSIS — R11.2 NAUSEA AND VOMITING, UNSPECIFIED VOMITING TYPE: Primary | ICD-10-CM

## 2023-03-02 DIAGNOSIS — R19.8 ABDOMINAL TIGHTNESS: ICD-10-CM

## 2023-03-02 DIAGNOSIS — R10.84 GENERALIZED ABDOMINAL PAIN: ICD-10-CM

## 2023-03-02 PROBLEM — E66.01 OBESITY, MORBID (HCC): Status: ACTIVE | Noted: 2023-03-02

## 2023-03-02 RX ORDER — CLINDAMYCIN PHOSPHATE 10 MG/G
GEL TOPICAL 2 TIMES DAILY
COMMUNITY
Start: 2022-11-02 | End: 2023-11-02

## 2023-03-02 RX ORDER — METOPROLOL SUCCINATE 50 MG/1
50 TABLET, EXTENDED RELEASE ORAL
COMMUNITY

## 2023-03-02 RX ORDER — BUPROPION HYDROCHLORIDE 150 MG/1
150 TABLET, EXTENDED RELEASE ORAL DAILY
COMMUNITY
Start: 2022-10-26

## 2023-03-02 RX ORDER — NIFEDIPINE 90 MG/1
1 TABLET, FILM COATED, EXTENDED RELEASE ORAL DAILY
COMMUNITY
Start: 2022-10-24

## 2023-03-02 RX ORDER — OXYCODONE AND ACETAMINOPHEN 7.5; 325 MG/1; MG/1
1 TABLET ORAL
COMMUNITY
Start: 2022-09-23

## 2023-03-02 RX ORDER — DICYCLOMINE HCL 20 MG
20 TABLET ORAL EVERY 8 HOURS PRN
Qty: 90 TABLET | Refills: 1 | Status: SHIPPED | OUTPATIENT
Start: 2023-03-02 | End: 2023-05-31

## 2023-03-02 RX ORDER — PANTOPRAZOLE SODIUM 40 MG/1
40 TABLET, DELAYED RELEASE ORAL DAILY
Qty: 30 TABLET | Refills: 1 | Status: SHIPPED | OUTPATIENT
Start: 2023-03-02 | End: 2023-04-01

## 2023-03-02 RX ORDER — ESCITALOPRAM OXALATE 20 MG/1
20 TABLET ORAL DAILY
COMMUNITY
Start: 2022-10-26

## 2023-03-02 RX ORDER — LISINOPRIL 40 MG/1
1 TABLET ORAL DAILY
COMMUNITY
Start: 2022-10-24

## 2023-03-02 RX ORDER — GABAPENTIN 600 MG/1
1 TABLET ORAL
COMMUNITY
Start: 2022-10-26

## 2023-03-02 RX ORDER — ATORVASTATIN CALCIUM 20 MG/1
1 TABLET, FILM COATED ORAL DAILY
COMMUNITY
Start: 2022-10-24

## 2023-03-02 RX ORDER — HYDRALAZINE HYDROCHLORIDE 50 MG/1
TABLET, FILM COATED ORAL
COMMUNITY
Start: 2022-10-24

## 2023-03-02 NOTE — PATIENT INSTRUCTIONS
Scheduled date of EGD/colonoscopy (as of today): TBD by ptn  Physician performing EGD/colonoscopy: Gaudencio Tovar  Location of EGD/colonoscopy: [facility]  Desired bowel prep reviewed with patient: Angelique/Serafin  Instructions reviewed with patient by: Nuha WALTER  Clearances:

## 2023-03-02 NOTE — PROGRESS NOTES
[facility] - Outpatient Consultation  Chet Persaud 56 y o  female MRN: [id number]  Encounter: [id number]          ASSESSMENT AND PLAN:      1  Abdominal pain  2  Nausea and vomiting    Patient presents for an evaluation of chronic upper abdominal discomfort/tightness and nausea with vomiting  She had a gastric sleeve in 2019  She had a recent CTA chest/abdomen/pelvis last month  Will plan for EGD and colonoscopy to investigate- evaluate for PUD, gastritis, h pylori, celiac, crohn's, etc   Ultrasound to evaluate the gallbladder  Will begin Pantoprazole 40mg po daily x 8 weeks for GERD  Bentyl prn  Recommend marijuana and vaping cessation  Follow up after above testing   ______________________________________________________________________    HPI:  Patient is a pleasant 56year old female with a PMH of asthma, hypertension who presents to the office for a gastrointestinal evaluation  Patient reports that she has been struggling with epigastric discomfort  She describes it as a tightness  She also reports nausea and vomiting at times  She has intermittent BM irregularities  She had a gastric sleeve surgery in 2019  She also had a hernia repair last year  She reports vaping and marijuana use  She has never had a colonoscopy  No family history of esophageal, stomach, or colon cancer  No family history of UC/crohn's  She recently had a CTA of the chest/abdomen/pelvis  REVIEW OF SYSTEMS:    CONSTITUTIONAL: Denies any fever, chills, rigors, and weight loss  HEENT: No earache or tinnitus  Denies hearing loss or visual disturbances  CARDIOVASCULAR: No chest pain or palpitations  RESPIRATORY: Denies any cough, hemoptysis, shortness of breath or dyspnea on exertion  GASTROINTESTINAL: As noted in the History of Present Illness  GENITOURINARY: No problems with urination  Denies any hematuria or dysuria  NEUROLOGIC: No dizziness or vertigo, denies headaches  MUSCULOSKELETAL: Denies any muscle or joint pain  SKIN: Denies skin rashes or itching  ENDOCRINE: Denies excessive thirst  Denies intolerance to heat or cold  PSYCHOSOCIAL: Denies depression or anxiety  Denies any recent memory loss  Historical Information   Past Medical History:   Diagnosis Date   • Asthma    • Depression    • Hernia of abdominal cavity    • Hypertension      Past Surgical History:   Procedure Laterality Date   • GASTRIC BYPASS LAPAROSCOPIC  2021    Sleeve   • HERNIA REPAIR       Social History   Social History     Substance and Sexual Activity   Alcohol Use Yes    Comment: Social     Social History     Substance and Sexual Activity   Drug Use Yes   • Types: Marijuana     Social History     Tobacco Use   Smoking Status Former   • Types: Cigarettes   • Quit date: 2008   • Years since quitting: 15 1   Smokeless Tobacco Never     History reviewed  No pertinent family history  Meds/Allergies       Current Outpatient Medications:   •  atorvastatin (LIPITOR) 20 mg tablet  •  buPROPion (WELLBUTRIN SR) 150 mg 12 hr tablet  •  clindamycin (CLINDAGEL) 1 % gel  •  dicyclomine (BENTYL) 20 mg tablet  •  escitalopram (LEXAPRO) 20 mg tablet  •  gabapentin (NEURONTIN) 600 MG tablet  •  hydrALAZINE (APRESOLINE) 50 mg tablet  •  lisinopril (ZESTRIL) 40 mg tablet  •  metoprolol succinate (TOPROL-XL) 50 mg 24 hr tablet  •  NIFEdipine (ADALAT CC) 90 mg 24 hr tablet  •  oxyCODONE-acetaminophen (PERCOCET) 7 5-325 MG per tablet  •  pantoprazole (PROTONIX) 40 mg tablet    Allergies   Allergen Reactions   • Zoloft [Sertraline] GI Intolerance     Hospital for 3 days           Objective     Height 5' 1" (1 549 m)  Body mass index is 41 24 kg/m²          PHYSICAL EXAM:      General Appearance:   Alert, cooperative, no distress   HEENT:   Normocephalic, atraumatic, anicteric      Neck:  Supple, symmetrical, trachea midline   Lungs:   Clear to auscultation bilaterally; no rales, rhonchi or wheezing; respirations unlabored    Heart[de-identified]   Regular rate and rhythm; no murmur, rub, or gallop  Abdomen:   Soft, non-tender, non-distended; normal bowel sounds; no masses, no organomegaly    Genitalia:   Deferred    Rectal:   Deferred    Extremities:  No cyanosis, clubbing or edema    Pulses:  2+ and symmetric    Skin:  No jaundice, rashes, or lesions    Lymph nodes:  No palpable cervical lymphadenopathy        Lab Results:   No visits with results within 1 Day(s) from this visit     Latest known visit with results is:   Admission on 02/21/2023, Discharged on 02/21/2023   Component Date Value   • Ventricular Rate 02/21/2023 61    • Atrial Rate 02/21/2023 61    • IN Interval 02/21/2023 232    • QRSD Interval 02/21/2023 84    • QT Interval 02/21/2023 434    • QTC Interval 02/21/2023 436    • QRS Axis 02/21/2023 90    • T Wave Axis 02/21/2023 86    • WBC 02/21/2023 4 57    • RBC 02/21/2023 4 12    • Hemoglobin 02/21/2023 11 8    • Hematocrit 02/21/2023 35 5    • MCV 02/21/2023 86    • MCH 02/21/2023 28 6    • MCHC 02/21/2023 33 2    • RDW 02/21/2023 14 9    • MPV 02/21/2023 11 8    • Platelets 50/60/9371 207    • nRBC 02/21/2023 0    • Neutrophils Relative 02/21/2023 44    • Immat GRANS % 02/21/2023 0    • Lymphocytes Relative 02/21/2023 42    • Monocytes Relative 02/21/2023 9    • Eosinophils Relative 02/21/2023 4    • Basophils Relative 02/21/2023 1    • Neutrophils Absolute 02/21/2023 2 02    • Immature Grans Absolute 02/21/2023 0 01    • Lymphocytes Absolute 02/21/2023 1 91    • Monocytes Absolute 02/21/2023 0 41    • Eosinophils Absolute 02/21/2023 0 18    • Basophils Absolute 02/21/2023 0 04    • Sodium 02/21/2023 142    • Potassium 02/21/2023 3 8    • Chloride 02/21/2023 113 (H)    • CO2 02/21/2023 24    • ANION GAP 02/21/2023 5    • BUN 02/21/2023 19    • Creatinine 02/21/2023 1 34 (H)    • Glucose 02/21/2023 75    • Calcium 02/21/2023 9 6    • eGFR 02/21/2023 44    • Total Bilirubin 02/21/2023 0 68    • Bilirubin, Direct 02/21/2023 0 14    • Alkaline Phosphatase 02/21/2023 66    • AST 02/21/2023 12 (L)    • ALT 02/21/2023 11    • Total Protein 02/21/2023 6 5    • Albumin 02/21/2023 3 9    • Lipase 02/21/2023 45    • Magnesium 02/21/2023 1 9    • hs TnI 0hr 02/21/2023 7    • Color, UA 02/21/2023 Light Yellow    • Clarity, UA 02/21/2023 Clear    • Specific Gravity, UA 02/21/2023 <=1 005    • pH, UA 02/21/2023 6 0    • Leukocytes, UA 02/21/2023 Negative    • Nitrite, UA 02/21/2023 Negative    • Protein, UA 02/21/2023 30 (1+) (A)    • Glucose, UA 02/21/2023 Negative    • Ketones, UA 02/21/2023 Negative    • Bilirubin, UA 02/21/2023 Negative    • Occult Blood, UA 02/21/2023 Negative    • Urobilinogen, UA 02/21/2023 0 2    • hs TnI 2hr 02/21/2023 5    • Delta 2hr hsTnI 02/21/2023 -2    • RBC, UA 02/21/2023 None Seen    • WBC, UA 02/21/2023 1-2    • Epithelial Cells 02/21/2023 Occasional    • Bacteria, UA 02/21/2023 None Seen          Radiology Results:   XR chest 1 view portable    Result Date: 2/22/2023  Narrative: CHEST INDICATION:   chest pain  COMPARISON:  12/14/2022 EXAM PERFORMED/VIEWS:  XR CHEST PORTABLE Single view FINDINGS: Cardiomediastinal silhouette appears unremarkable  The lungs are clear  No pneumothorax or pleural effusion  Osseous structures appear within normal limits for patient age  Impression: No acute cardiopulmonary disease  Findings are stable Workstation performed: [id number]     CTA dissection protocol chest abdomen pelvis w wo contrast    Result Date: 2/21/2023  Narrative: CTA - CHEST, ABDOMEN AND PELVIS - WITHOUT AND WITH IV CONTRAST INDICATION:   severe thoracic back pain radiating into abdomen and occasionally into chest  COMPARISON:  7/28/2022 TECHNIQUE: CT examination of the chest, abdomen and pelvis was performed both prior to and after the administration of intravenous contrast   The noncontrast portion of this examination was performed utilizing low radiation dose technique     Thin section angiographic arterial phase post contrast technique was used in order to evaluate for aortic dissection  3D reformatted images and volume rendering were performed on an independent workstation  Additionally, axial, sagittal, and coronal 2D reformatted  images were created from the source data and submitted for interpretation  Radiation dose length product (DLP) for this visit:  021 821 37 16 mGy-cm   This examination, like all CT scans performed in the [facility], was performed utilizing techniques to minimize radiation dose exposure, including the use of iterative reconstruction and automated exposure control  IV Contrast:  100 mL of iohexol (OMNIPAQUE) Enteric Contrast:  Enteric contrast was not administered  FINDINGS: AORTA:  There is no aortic dissection or intramural hematoma  There is no aortic aneurysm  CHEST LUNGS:  Lungs are clear  There is no tracheal or endobronchial lesion  PLEURA:  Unremarkable  HEART/PULMONARY ARTERIAL TREE:  Unremarkable for patient's age  Prominent pulmonary arteries suggest pulmonary artery hypertension  Recommend echocardiography in the appropriate clinical setting  MEDIASTINUM AND BLANCA:  Unremarkable  CHEST WALL AND LOWER NECK:   Left posterior thoracic subcutaneous fat stranding medial to the left scapula  Recommend direct visualization  ABDOMEN LIVER/BILIARY TREE:  Liver is diffusely decreased in density consistent with fatty change  No CT evidence of suspicious hepatic mass  Normal hepatic contours  No biliary dilatation  GALLBLADDER:  No calcified gallstones  No pericholecystic inflammatory change  SPLEEN:  Unremarkable  PANCREAS:  Unremarkable  ADRENAL GLANDS:  There is low density lobulated thickening of adrenal glands bilaterally statistically most likely to represent adenomatous hyperplasia  KIDNEYS/URETERS: Excreted IV contrast is seen in the collecting systems and ureters No hydronephrosis or urinary tract calculus    One or more sharply circumscribed subcentimeter renal hypodensities are present, too small to accurately characterize, and statistically most likely benign findings  According to recent literature (Radiology 2019) no further workup of these findings is recommended  STOMACH AND BOWEL:  There is colonic diverticulosis without evidence of acute diverticulitis  Post sleeve gastrectomy  APPENDIX:  A normal appendix was visualized  ABDOMINOPELVIC CAVITY:  No ascites or free intraperitoneal air  No lymphadenopathy  PELVIS REPRODUCTIVE ORGANS:  Unremarkable for patient's age  URINARY BLADDER:  Unremarkable  ABDOMINAL WALL/INGUINAL REGIONS:  Postsurgical changes are seen in the intra-abdominal wall  OSSEOUS STRUCTURES:  No acute fracture or destructive osseous lesion  Impression: Left posterior thoracic subcutaneous fat stranding medial to the left scapula  Recommend direct visualization  No aortic aneurysm or dissection   Workstation performed: [id number]

## 2023-04-03 ENCOUNTER — APPOINTMENT (OUTPATIENT)
Dept: OBGYN | Facility: CLINIC | Age: 58
End: 2023-04-03

## 2024-03-05 NOTE — H&P PST ADULT - PROBLEM/PLAN-5
No protocol for requested medication.  Medication:   Disp Refills Start End     ALPRAZolam (XANAX) 2 MG tablet 60 tablet 0 12/28/2023 2/26/2024     Sig - Route: May take 1 tablet by mouth 2 times daily as needed for Anxiety. May also take 0.5 tablets nightly as needed for Anxiety. Must last 30 days. - Oral     Sent to pharmacy as: ALPRAZolam 2 MG Oral Tablet (XANAX)         Disp Refills Start End     DULoxetine (CYMBALTA) 30 MG capsule 30 capsule 1 12/28/2023 --     Sig: Take Duloxetine 30 mg daily along with Duloxetine 60 mg daily.     Sent to pharmacy as: DULoxetine HCl 30 MG Oral Capsule Delayed Release Particles (CYMBALTA)         Disp Refills Start End     DULoxetine (CYMBALTA) 60 MG capsule 30 capsule 1 12/28/2023 --     Sig: Take Duloxetine 60 mg daily along with Duloxetine 30 mg daily.     Sent to pharmacy as: DULoxetine HCl 60 MG Oral Capsule Delayed Release Particles (CYMBALTA)        Last appointment: 4/21/2023 with ZHEN Marcus  Advised follow up: 4 months (cancel x2 pt and x3 clinician)  Appointment: 3/20/2024 with Dr. Moe    Last refill: 1/31/2024 per PDMP  Is the patient due for refill of this medication(s): Yes  PDMP review: Criteria met. Forwarded to Physician/KYLER for signature.     Pharmacy: [id number] [address] AT [facility] [address]    Order pended, routed to ON CALL clinician for review. Okay to refill in the absence of ZHEN Marcus?   DISPLAY PLAN FREE TEXT

## 2024-04-01 ENCOUNTER — NON-APPOINTMENT (OUTPATIENT)
Age: 59
End: 2024-04-01

## 2024-04-01 ENCOUNTER — APPOINTMENT (OUTPATIENT)
Dept: OBGYN | Facility: CLINIC | Age: 59
End: 2024-04-01

## 2024-04-01 ENCOUNTER — LABORATORY RESULT (OUTPATIENT)
Age: 59
End: 2024-04-01

## 2024-04-01 VITALS
DIASTOLIC BLOOD PRESSURE: 82 MMHG | SYSTOLIC BLOOD PRESSURE: 122 MMHG | WEIGHT: 206 LBS | HEIGHT: 64 IN | BODY MASS INDEX: 35.17 KG/M2

## 2024-04-01 DIAGNOSIS — Z11.51 ENCOUNTER FOR SCREENING FOR HUMAN PAPILLOMAVIRUS (HPV): ICD-10-CM

## 2024-04-01 DIAGNOSIS — Z12.39 ENCOUNTER FOR OTHER SCREENING FOR MALIGNANT NEOPLASM OF BREAST: ICD-10-CM

## 2024-04-01 DIAGNOSIS — N76.0 ACUTE VAGINITIS: ICD-10-CM

## 2024-04-01 DIAGNOSIS — Z11.3 ENCOUNTER FOR SCREENING FOR INFECTIONS WITH A PREDOMINANTLY SEXUAL MODE OF TRANSMISSION: ICD-10-CM

## 2024-04-01 DIAGNOSIS — Z12.4 ENCOUNTER FOR SCREENING FOR MALIGNANT NEOPLASM OF CERVIX: ICD-10-CM

## 2024-04-01 DIAGNOSIS — Z01.419 ENCOUNTER FOR GYNECOLOGICAL EXAMINATION (GENERAL) (ROUTINE) W/OUT ABNORMAL FINDINGS: ICD-10-CM

## 2024-04-01 PROCEDURE — 99203 OFFICE O/P NEW LOW 30 MIN: CPT | Mod: 25

## 2024-04-01 PROCEDURE — 99386 PREV VISIT NEW AGE 40-64: CPT

## 2024-04-01 PROCEDURE — 36415 COLL VENOUS BLD VENIPUNCTURE: CPT

## 2024-04-01 RX ORDER — METRONIDAZOLE 500 MG/1
500 TABLET ORAL TWICE DAILY
Qty: 14 | Refills: 1 | Status: ACTIVE | COMMUNITY
Start: 2024-04-01 | End: 1900-01-01

## 2024-04-01 RX ORDER — SERTRALINE HYDROCHLORIDE 25 MG/1
TABLET, FILM COATED ORAL
Refills: 0 | Status: ACTIVE | COMMUNITY

## 2024-04-01 NOTE — HISTORY OF PRESENT ILLNESS
[Patient reported PAP Smear was normal] : Patient reported PAP Smear was normal [Patient reported colonoscopy was normal] : Patient reported colonoscopy was normal [postmenopausal] : postmenopausal [LMP unknown] : LMP unknown [N] : Patient does not use contraception [Y] : Positive pregnancy history [PapSmeardate] : 2021 [ColonoscopyDate] : 2022 [PGHxTotal] : 4 [[facility] : [facility] [PGHxAbortions] : 2 [unknown] : Patient is unsure of the date of her LMP [[facility] : 2 [Menarche Age: ____] : age at menarche was [unfilled] [No] : Patient does not have concerns regarding sex [Currently Active] : currently active [FreeTextEntry1] : HPI    59 y/o NEW gyn annual   c/o of discharge and itching vaginal odor , new partner c/o of trouble w/ orgasm    LMP:   Last pap:  denies prior abnormal  Last MMG:   Contraception: Gardasil hx:    PMH: CHF, Asthma, T2 DM (denies), CHTN, MI  PSH: , hernia repair, knee surgery, LORY (ovaries present), GB ALL: nkda  Sochx: former Tobacco, denies etoh, denies illicits, THC social OBHx:  x2. 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 Yris Peterson DO, MPH, FACOG

## 2024-04-01 NOTE — HISTORY OF PRESENT ILLNESS
[Patient reported colonoscopy was normal] : Patient reported colonoscopy was normal [Patient reported PAP Smear was normal] : Patient reported PAP Smear was normal [postmenopausal] : postmenopausal [LMP unknown] : LMP unknown [N] : Patient does not use contraception [Y] : Positive pregnancy history [PapSmeardate] : 2021 [ColonoscopyDate] : 2022 [PGHxTotal] : 4 [[facility]FullTerm] : 2 [PGHxAbortions] : 2 [[facility]Living] : 2 [unknown] : Patient is unsure of the date of her LMP [Menarche Age: ____] : age at menarche was [unfilled] [No] : Patient does not have concerns regarding sex [Currently Active] : currently active [FreeTextEntry1] : HPI    57 y/o NEW gyn annual   c/o of discharge and itching vaginal odor , new partner c/o of trouble w/ orgasm    LMP:   Last pap:  denies prior abnormal  Last MMG:   Contraception: Gardasil hx:    PMH: CHF, Asthma, T2 DM (denies), CHTN, MI  PSH: , hernia repair, knee surgery, LORY (ovaries present), GB ALL: nkda  Sochx: former Tobacco, denies etoh, denies illicits, THC social OBHx:  x2. 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 Yris Peterson DO, MPH, FACOG

## 2024-04-02 LAB
HBV SURFACE AG SER QL: NONREACTIVE
HCV AB SER QL: NONREACTIVE
HCV S/CO RATIO: 0.05 S/CO
HIV1+2 AB SPEC QL IA.RAPID: NONREACTIVE
HPV HIGH+LOW RISK DNA PNL CVX: NOT DETECTED
T PALLIDUM AB SER QL IA: NEGATIVE

## 2024-04-03 ENCOUNTER — NON-APPOINTMENT (OUTPATIENT)
Age: 59
End: 2024-04-03

## 2024-04-04 NOTE — PHYSICAL EXAM
[Absent] : absent [Uterine Adnexae] : normal [Chaperone Present] : A chaperone was present in the examining room during all aspects of the physical examination [Appropriately responsive] : appropriately responsive [Alert] : alert [No Acute Distress] : no acute distress [No Lymphadenopathy] : no lymphadenopathy [Soft] : soft [Non-tender] : non-tender [Non-distended] : non-distended [Oriented x3] : oriented x3 [Examination Of The Breasts] : a normal appearance [Normal] : normal [No Masses] : no breast masses were palpable [Discharge] : a  ~M vaginal discharge was present

## 2024-04-08 LAB
CANDIDA VAG CYTO: NOT DETECTED
CYTOLOGY CVX/VAG DOC THIN PREP: ABNORMAL
G VAGINALIS+PREV SP MTYP VAG QL MICRO: NOT DETECTED
T VAGINALIS VAG QL WET PREP: DETECTED

## 2024-05-06 ENCOUNTER — APPOINTMENT (OUTPATIENT)
Dept: OBGYN | Facility: CLINIC | Age: 59
End: 2024-05-06

## 2024-05-21 ENCOUNTER — APPOINTMENT (OUTPATIENT)
Dept: OBGYN | Facility: CLINIC | Age: 59
End: 2024-05-21

## 2024-05-23 RX ORDER — METRONIDAZOLE 500 MG/1
500 TABLET ORAL TWICE DAILY
Qty: 14 | Refills: 1 | Status: ACTIVE | COMMUNITY
Start: 2024-04-03

## 2024-06-04 ENCOUNTER — NON-APPOINTMENT (OUTPATIENT)
Age: 59
End: 2024-06-04

## 2024-06-10 ENCOUNTER — APPOINTMENT (OUTPATIENT)
Dept: OBGYN | Facility: CLINIC | Age: 59
End: 2024-06-10

## 2024-06-11 ENCOUNTER — NON-APPOINTMENT (OUTPATIENT)
Age: 59
End: 2024-06-11

## 2024-06-18 ENCOUNTER — APPOINTMENT (OUTPATIENT)
Dept: OBGYN | Facility: CLINIC | Age: 59
End: 2024-06-18

## 2024-06-19 ENCOUNTER — APPOINTMENT (OUTPATIENT)
Dept: MAMMOGRAPHY | Facility: CLINIC | Age: 59
End: 2024-06-19
Payer: MEDICARE

## 2024-06-19 ENCOUNTER — RESULT REVIEW (OUTPATIENT)
Age: 59
End: 2024-06-19

## 2024-06-19 ENCOUNTER — OUTPATIENT (OUTPATIENT)
Dept: OUTPATIENT SERVICES | Facility: HOSPITAL | Age: 59
LOS: 1 days | End: 2024-06-19
Payer: MEDICARE

## 2024-06-19 ENCOUNTER — APPOINTMENT (OUTPATIENT)
Dept: ULTRASOUND IMAGING | Facility: CLINIC | Age: 59
End: 2024-06-19
Payer: MEDICARE

## 2024-06-19 DIAGNOSIS — E89.0 POSTPROCEDURAL HYPOTHYROIDISM: Chronic | ICD-10-CM

## 2024-06-19 DIAGNOSIS — Z12.39 ENCOUNTER FOR OTHER SCREENING FOR MALIGNANT NEOPLASM OF BREAST: ICD-10-CM

## 2024-06-19 DIAGNOSIS — Z90.710 ACQUIRED ABSENCE OF BOTH CERVIX AND UTERUS: Chronic | ICD-10-CM

## 2024-06-19 DIAGNOSIS — Z98.891 HISTORY OF UTERINE SCAR FROM PREVIOUS SURGERY: Chronic | ICD-10-CM

## 2024-06-19 PROCEDURE — 77067 SCR MAMMO BI INCL CAD: CPT | Mod: 26

## 2024-06-19 PROCEDURE — 77063 BREAST TOMOSYNTHESIS BI: CPT | Mod: 26

## 2024-06-19 PROCEDURE — 77067 SCR MAMMO BI INCL CAD: CPT

## 2024-06-19 PROCEDURE — 77063 BREAST TOMOSYNTHESIS BI: CPT

## 2024-06-20 ENCOUNTER — NON-APPOINTMENT (OUTPATIENT)
Age: 59
End: 2024-06-20

## 2024-06-24 ENCOUNTER — APPOINTMENT (OUTPATIENT)
Dept: OBGYN | Facility: CLINIC | Age: 59
End: 2024-06-24

## 2024-06-24 VITALS
SYSTOLIC BLOOD PRESSURE: 124 MMHG | BODY MASS INDEX: 35.85 KG/M2 | HEIGHT: 64 IN | DIASTOLIC BLOOD PRESSURE: 82 MMHG | WEIGHT: 210 LBS

## 2024-06-24 DIAGNOSIS — F17.200 NICOTINE DEPENDENCE, UNSPECIFIED, UNCOMPLICATED: ICD-10-CM

## 2024-06-24 DIAGNOSIS — A59.9 TRICHOMONIASIS, UNSPECIFIED: ICD-10-CM

## 2024-06-24 PROCEDURE — 99406 BEHAV CHNG SMOKING 3-10 MIN: CPT

## 2024-06-24 PROCEDURE — 99459 PELVIC EXAMINATION: CPT

## 2024-06-24 PROCEDURE — 99214 OFFICE O/P EST MOD 30 MIN: CPT

## 2024-06-25 LAB
C TRACH RRNA SPEC QL NAA+PROBE: NOT DETECTED
N GONORRHOEA RRNA SPEC QL NAA+PROBE: NOT DETECTED
SOURCE AMPLIFICATION: NORMAL
SOURCE AMPLIFICATION: NORMAL
T VAGINALIS RRNA SPEC QL NAA+PROBE: NOT DETECTED

## 2024-06-26 ENCOUNTER — NON-APPOINTMENT (OUTPATIENT)
Age: 59
End: 2024-06-26

## 2024-09-11 NOTE — H&P PST ADULT - NEGATIVE ENMT SYMPTOMS
Sunscreen Recommendations: Prescribed broad spectrum sunscreen spf 30+ Detail Level: Detailed Detail Level: Generalized Detail Level: Zone Patient Specific Counseling (Will Not Stick From Patient To Patient): Patient says this is enlarging and darkening no sinus symptoms/no ear pain/no nasal congestion/no tinnitus/no hearing difficulty/no vertigo

## 2025-04-07 ENCOUNTER — APPOINTMENT (OUTPATIENT)
Dept: OBGYN | Facility: CLINIC | Age: 60
End: 2025-04-07